# Patient Record
Sex: FEMALE | Race: BLACK OR AFRICAN AMERICAN | Employment: PART TIME | URBAN - METROPOLITAN AREA
[De-identification: names, ages, dates, MRNs, and addresses within clinical notes are randomized per-mention and may not be internally consistent; named-entity substitution may affect disease eponyms.]

---

## 2017-01-30 ENCOUNTER — HOSPITAL ENCOUNTER (OUTPATIENT)
Dept: LAB | Age: 42
Discharge: HOME | End: 2017-01-30
Payer: MEDICARE

## 2017-01-30 ENCOUNTER — OFFICE VISIT (OUTPATIENT)
Dept: INTERNAL MEDICINE CLINIC | Age: 42
End: 2017-01-30

## 2017-01-30 VITALS
WEIGHT: 218.4 LBS | RESPIRATION RATE: 18 BRPM | DIASTOLIC BLOOD PRESSURE: 103 MMHG | HEIGHT: 66 IN | HEART RATE: 81 BPM | SYSTOLIC BLOOD PRESSURE: 149 MMHG | OXYGEN SATURATION: 99 % | BODY MASS INDEX: 35.1 KG/M2 | TEMPERATURE: 98.5 F

## 2017-01-30 DIAGNOSIS — Z11.1 SCREENING-PULMONARY TB: ICD-10-CM

## 2017-01-30 PROCEDURE — 86480 TB TEST CELL IMMUN MEASURE: CPT

## 2017-01-30 PROCEDURE — 80053 COMPREHEN METABOLIC PANEL: CPT

## 2017-01-30 NOTE — PROGRESS NOTES
HISTORY OF PRESENT ILLNESS  Idamae Hodgkins is a 37 y.o. female presents for TB screening  HPI  TB screening required by employer. Patient works at a assisted living facility. No history of positive PPD. Denies other problems or concerns. Past Medical History   Diagnosis Date    Bipolar 1 disorder ([address]     Depression     Eczema     Elevated lipids     History of kidney stones        Current Outpatient Prescriptions on File Prior to Visit   Medication Sig Dispense Refill    ibuprofen (MOTRIN) 800 mg tablet Take 1 tablet by mouth every eight (8) hours as needed for Pain. 20 tablet 0    OLANZapine (ZYPREXA) 20 mg tablet Take 20 mg by mouth nightly.  triamcinolone (ARISTOCORT) 0.5 % topical cream USE A THIN LAYER AND APPLY TO AFFECTED AREA TWICE A DAY 15 g 0    gemfibrozil (LOPID) 600 mg tablet TAKE 1 TABLET BY MOUTH 2 TIMES A DAY. 60 Tab 3    topiramate (TOPAMAX) 50 mg tablet Take  by mouth nightly. No current facility-administered medications on file prior to visit. Review of Systems   Constitutional: Negative. Eyes: Negative. Cardiovascular: Negative. Negative for chest pain and leg swelling. Neurological: Negative for dizziness and headaches. Physical Exam   Constitutional: She is oriented to person, place, and time. She appears well-developed and well-nourished. No distress. HENT:   Right Ear: External ear normal.   Left Ear: External ear normal.   Nose: Nose normal.   Mouth/Throat: Oropharynx is clear and moist. No oropharyngeal exudate. Eyes: Conjunctivae are normal. Right eye exhibits no discharge. Left eye exhibits no discharge. Neck: No thyromegaly present. Cardiovascular: Normal rate, regular rhythm and normal heart sounds. Pulmonary/Chest: Effort normal and breath sounds normal.   Musculoskeletal: She exhibits no edema, tenderness or deformity. Lymphadenopathy:     She has no cervical adenopathy.    Neurological: She is alert and oriented to person, place, and time. Skin: Skin is warm and dry. She is not diaphoretic. Psychiatric: She has a normal mood and affect. Her behavior is normal. Judgment and thought content normal.       ASSESSMENT and PLAN    ICD-10-CM ICD-9-CM    1. Elevated blood pressure T33 534.8 METABOLIC PANEL, COMPREHENSIVE   2. Screening-pulmonary TB Z11.1 V74.1 QUANTIFERON TB GOLD     Follow-up Disposition:  Return in about 2 weeks (around 2/13/2017) for blood pressure. reviewed diet, exercise and weight control    1. Not new, reviewed prior blood pressure readings with patient.  She wishes to defer treatment unless no improvement on follow up

## 2017-01-30 NOTE — PROGRESS NOTES
RM#8  Chief Complaint   Patient presents with    PPD Reading     1. Have you been to the ER, urgent care clinic since your last visit? Hospitalized since your last visit? No    2. Have you seen or consulted any other health care providers outside of the [address] [place] [address] since your last visit? Include any pap smears or colon screening.  No  No living will ADV

## 2017-01-30 NOTE — PATIENT INSTRUCTIONS
Learning About Cortez Mcnulty  What is a living will? A living will is a legal form you use to write down the kind of care you want at the end of your life. It is used by the health professionals who will treat you if you aren't able to decide for yourself. If you put your wishes in writing, your loved ones and others will know what kind of care you want. They won't need to guess. This can ease your mind and be helpful to others. A living will is not the same as an estate or property will. An estate will explains what you want to happen with your money and property after you die. Is a living will a legal document? A living will is a legal document. Each state has its own laws about living skinner. If you move to another state, make sure that your living will is legal in the state where you now live. Or you might use a universal form that has been approved by many states. This kind of form can sometimes be completed and stored online. Your electronic copy will then be available wherever you have a connection to the Internet. In most cases, doctors will respect your wishes even if you have a form from a different state. · You don't need an  to complete a living will. But legal advice can be helpful if your state's laws are unclear, your health history is complicated, or your family can't agree on what should be in your living will. · You can change your living will at any time. Some people find that their wishes about end-of-life care change as their health changes. · In addition to making a living will, think about completing a medical power of  form. This form lets you name the person you want to make end-of-life treatment decisions for you (your \"health care agent\") if you're not able to. Many hospitals and nursing homes will give you the forms you need to complete a living will and a medical power of .   · Your living will is used only if you can't make or communicate decisions for yourself anymore. If you become able to make decisions again, you can accept or refuse any treatment, no matter what you wrote in your living will. · Your state may offer an online registry. This is a place where you can store your living will online so the doctors and nurses who need to treat you can find it right away. What should you think about when creating a living will? Talk about your end-of-life wishes with your family members and your doctor. Let them know what you want. That way the people making decisions for you won't be surprised by your choices. Think about these questions as you make your living will:  · Do you know enough about life support methods that might be used? If not, talk to your doctor so you know what might be done if you can't breathe on your own, your heart stops, or you're unable to swallow. · What things would you still want to be able to do after you receive life-support methods? Would you want to be able to walk? To speak? To eat on your own? To live without the help of machines? · If you have a choice, where do you want to be cared for? In your home? At a hospital or nursing home? · Do you want certain Scientology practices performed if you become very ill? · If you have a choice at the end of your life, where would you prefer to die? At home? In a hospital or nursing home? Somewhere else? · Would you prefer to be buried or cremated? · Do you want your organs to be donated after you die? What should you do with your living will? · Make sure that your family members and your health care agent have copies of your living will. · Give your doctor a copy of your living will to keep in your medical record. If you have more than one doctor, make sure that each one has a copy. · You may want to put a copy of your living will where it can be easily found. Where can you learn more? Go to http://shreya-ann.info/.   Enter F051 in the search box to learn more about \"Learning About Living Merle. \"  Current as of: February 24, 2016  Content Version: 11.1  © 2305-4291 [employer]. Care instructions adapted under license by [employer] (which disclaims liability or warranty for this information). If you have questions about a medical condition or this instruction, always ask your healthcare professional. [address] any warranty or liability for your use of this information. Low Sodium Diet (2,000 Milligram): Care Instructions  Your Care Instructions  Too much sodium causes your body to hold on to extra water. This can raise your blood pressure and force your heart and kidneys to work harder. In very serious cases, this could cause you to be put in the hospital. It might even be life-threatening. By limiting sodium, you will feel better and lower your risk of serious problems. The most common source of sodium is salt. People get most of the salt in their diet from canned, prepared, and packaged foods. Fast food and restaurant meals also are very high in sodium. Your doctor will probably limit your sodium to less than 2,000 milligrams (mg) a day. This limit counts all the sodium in prepared and packaged foods and any salt you add to your food. Follow-up care is a key part of your treatment and safety. Be sure to make and go to all appointments, and call your doctor if you are having problems. It's also a good idea to know your test results and keep a list of the medicines you take. How can you care for yourself at home? Read food labels  · Read labels on cans and food packages. The labels tell you how much sodium is in each serving. Make sure that you look at the serving size. If you eat more than the serving size, you have eaten more sodium. · Food labels also tell you the Percent Daily Value for sodium. Choose products with low Percent Daily Values for sodium.   · Be aware that sodium can come in forms other than salt, including monosodium glutamate (MSG), sodium citrate, and sodium bicarbonate (baking soda). MSG is often added to Asian food. When you eat out, you can sometimes ask for food without MSG or added salt. Buy low-sodium foods  · Buy foods that are labeled \"unsalted\" (no salt added), \"sodium-free\" (less than 5 mg of sodium per serving), or \"low-sodium\" (less than 140 mg of sodium per serving). Foods labeled \"reduced-sodium\" and \"light sodium\" may still have too much sodium. Be sure to read the label to see how much sodium you are getting. · Buy fresh vegetables, or frozen vegetables without added sauces. Buy low-sodium versions of canned vegetables, soups, and other canned goods. Prepare low-sodium meals  · Cut back on the amount of salt you use in cooking. This will help you adjust to the taste. Do not add salt after cooking. One teaspoon of salt has about 2,300 mg of sodium. · Take the salt shaker off the table. · Flavor your food with garlic, lemon juice, onion, vinegar, herbs, and spices. Do not use soy sauce, lite soy sauce, steak sauce, onion salt, garlic salt, celery salt, mustard, or ketchup on your food. · Use low-sodium salad dressings, sauces, and ketchup. Or make your own salad dressings and sauces without adding salt. · Use less salt (or none) when recipes call for it. You can often use half the salt a recipe calls for without losing flavor. Other foods such as rice, pasta, and grains do not need added salt. · Rinse canned vegetables, and cook them in fresh water. This removes somebut not allof the salt. · Avoid water that is naturally high in sodium or that has been treated with water softeners, which add sodium. Call your local water company to find out the sodium content of your water supply. If you buy bottled water, read the label and choose a sodium-free brand. Avoid high-sodium foods  · Avoid eating:  ¨ Smoked, cured, salted, and canned meat, fish, and poultry.   ¨ Ham, botello, hot dogs, and luncheon meats. ¨ Regular, hard, and processed cheese and regular peanut butter. ¨ Crackers with salted tops, and other salted snack foods such as pretzels, chips, and salted popcorn. ¨ Frozen prepared meals, unless labeled low-sodium. ¨ Canned and dried soups, broths, and bouillon, unless labeled sodium-free or low-sodium. ¨ Canned vegetables, unless labeled sodium-free or low-sodium. ¨ [place] fries, pizza, tacos, and other fast foods. ¨ Pickles, olives, ketchup, and other condiments, especially soy sauce, unless labeled sodium-free or low-sodium. Where can you learn more? Go to http://shreya-ann.info/. Enter F416 in the search box to learn more about \"Low Sodium Diet (2,000 Milligram): Care Instructions. \"  Current as of: July 26, 2016  Content Version: 11.1  © 7053-2555 [employer]. Care instructions adapted under license by [employer] (which disclaims liability or warranty for this information). If you have questions about a medical condition or this instruction, always ask your healthcare professional. [place] [address] any warranty or liability for your use of this information. Acute High Blood Pressure: Care Instructions  Your Care Instructions  Acute high blood pressure is very high blood pressure. It's a serious problem. Very high blood pressure can damage your brain, heart, eyes, and kidneys. You may have been given medicines to lower your blood pressure. You may have gotten them as pills or through a needle in one of your veins. This is called an IV. And maybe you were given other medicines too. These can be needed when high blood pressure causes other problems. To keep your blood pressure at a lower level, you may need to make healthy lifestyle changes. And you will probably need to take medicines. Be sure to follow up with your doctor about your blood pressure and what you can do about it.   Follow-up care is a key part of your treatment and safety. Be sure to make and go to all appointments, and call your doctor if you are having problems. It's also a good idea to know your test results and keep a list of the medicines you take. How can you care for yourself at home? · See your doctor as often as he or she recommends. This is to make sure your blood pressure is under control. You will probably go at least 2 times a year. But it may be more often at first.  · Take your blood pressure medicine exactly as prescribed. You may take one or more types. They include diuretics, beta-blockers, ACE inhibitors, calcium channel blockers, and angiotensin II receptor blockers. Call your doctor if you think you are having a problem with your medicine. · If you take blood pressure medicine, talk to your doctor before you take decongestants or anti-inflammatory medicine, such as ibuprofen. These can raise blood pressure. · Learn how to check your blood pressure at home. Check it often. · Ask your doctor if it's okay to drink alcohol. · Talk to your doctor about lifestyle changes that can help blood pressure. These include being active and not smoking. When should you call for help? Call 911 anytime you think you may need emergency care. This may mean having symptoms that suggest that your blood pressure is causing a serious heart or blood vessel problem. Your blood pressure may be over 180/110. For example, call 911 if:  · You have symptoms of a heart attack. These may include:  ¨ Chest pain or pressure, or a strange feeling in the chest.  ¨ Sweating. ¨ Shortness of breath. ¨ Nausea or vomiting. ¨ Pain, pressure, or a strange feeling in the back, neck, jaw, or upper belly or in one or both shoulders or arms. ¨ Lightheadedness or sudden weakness. ¨ A fast or irregular heartbeat. · You have symptoms of a stroke.  These may include:  ¨ Sudden numbness, tingling, weakness, or loss of movement in your face, arm, or leg, especially on only one side of your body. ¨ Sudden vision changes. ¨ Sudden trouble speaking. ¨ Sudden confusion or trouble understanding simple statements. ¨ Sudden problems with walking or balance. ¨ A sudden, severe headache that is different from past headaches. · You have severe back or belly pain. Do not wait until your blood pressure comes down on its own. Get help right away. Call your doctor now or seek immediate care if:  · Your blood pressure is much higher than normal (such as 180/110 or higher), but you don't have symptoms. · You think high blood pressure is causing symptoms, such as:  ¨ Severe headache. ¨ Blurry vision. Watch closely for changes in your health, and be sure to contact your doctor if:  · Your blood pressure measures 140/90 or higher at least 2 times. That means the top number is 140 or higher or the bottom number is 90 or higher, or both. · You think you may be having side effects from your blood pressure medicine. · Your blood pressure is usually normal, but it goes above normal at least 2 times. Where can you learn more? Go to http://shreya-ann.info/. Enter L806 in the search box to learn more about \"Acute High Blood Pressure: Care Instructions. \"  Current as of: August 8, 2016  Content Version: 11.1  © 4601-0826 [employer]. Care instructions adapted under license by [employer] (which disclaims liability or warranty for this information). If you have questions about a medical condition or this instruction, always ask your healthcare professional. [place] [address] any warranty or liability for your use of this information.

## 2017-01-30 NOTE — MR AVS SNAPSHOT
Visit Information Date & Time Provider Department Dept. Phone Encounter #  
 1/30/2017  2:15 PM Dirk Yang [place] [address] and Internal Medicine [phone] [id number] Follow-up Instructions Return in about 2 weeks (around 2/13/2017) for blood pressure. Upcoming Health Maintenance Date Due DTaP/Tdap/Td series (1 - Tdap) 4/27/1997 PAP AKA CERVICAL CYTOLOGY 1/7/2016 INFLUENZA AGE 9 TO ADULT 8/1/2016 Allergies as of 1/30/2017  Review Complete On: 1/30/2017 By: Dickson Nascimento NP No Known Allergies Current Immunizations  Reviewed on 9/30/2013 Name Date Influenza Vaccine 10/21/2015, 9/30/2013 Influenza Vaccine PF 9/26/2014 Not reviewed this visit You Were Diagnosed With   
  
 Codes Comments Elevated blood pressure    -  Primary ICD-10-CM: I10 
ICD-9-CM: 401.9 Screening-pulmonary TB     ICD-10-CM: Z11.1 ICD-9-CM: V74.1 Vitals BP Pulse Temp Resp Height(growth percentile) Weight(growth percentile) (!) 149/103 (BP 1 Location: Right arm, BP Patient Position: Sitting) 81 98.5 °F (36.9 °C) (Oral) 18 5' 5.98\" (1.676 m) 218 lb 6.4 oz (99.1 kg) LMP SpO2 BMI OB Status Smoking Status 01/10/2017 (Exact Date) 99% 35.27 kg/m2 Having regular periods Never Smoker BMI and BSA Data Body Mass Index Body Surface Area  
 35.27 kg/m 2 2.15 m 2 Preferred Pharmacy Pharmacy Name Phone [id number] [place], [address] [place] [address] [phone] Your Updated Medication List  
  
   
This list is accurate as of: 1/30/17  2:57 PM.  Always use your most recent med list.  
  
  
  
  
 gemfibrozil 600 mg tablet Commonly known as:  LOPID TAKE 1 TABLET BY MOUTH 2 TIMES A DAY. ibuprofen 800 mg tablet Commonly known as:  MOTRIN Take 1 tablet by mouth every eight (8) hours as needed for Pain. TOPAMAX 50 mg tablet Generic drug:  topiramate Take  by mouth nightly. triamcinolone 0.5 % topical cream  
Commonly known as:  ARISTOCORT  
USE A THIN LAYER AND APPLY TO AFFECTED AREA TWICE A DAY ZyPREXA 20 mg tablet Generic drug:  OLANZapine Take 20 mg by mouth nightly. We Performed the Following METABOLIC PANEL, COMPREHENSIVE [25513 CPT(R)] QUANTIFERON TB GOLD [HCP89558 Custom] Follow-up Instructions Return in about 2 weeks (around 2/13/2017) for blood pressure. Patient Instructions Marco [address] What is a living will? A living will is a legal form you use to write down the kind of care you want at the end of your life. It is used by the health professionals who will treat you if you aren't able to decide for yourself. If you put your wishes in writing, your loved ones and others will know what kind of care you want. They won't need to guess. This can ease your mind and be helpful to others. A living will is not the same as an estate or property will. An estate will explains what you want to happen with your money and property after you die. Is a living will a legal document? A living will is a legal document. Each state has its own laws about living skinner. If you move to another state, make sure that your living will is legal in the state where you now live. Or you might use a universal form that has been approved by many states. This kind of form can sometimes be completed and stored online. Your electronic copy will then be available wherever you have a connection to the Internet. In most cases, doctors will respect your wishes even if you have a form from a different state. · You don't need an  to complete a living will. But legal advice can be helpful if your state's laws are unclear, your health history is complicated, or your family can't agree on what should be in your living will. · You can change your living will at any time.  Some people find that their wishes about end-of-life care change as their health changes. · In addition to making a living will, think about completing a medical power of  form. This form lets you name the person you want to make end-of-life treatment decisions for you (your \"health care agent\") if you're not able to. Many hospitals and nursing homes will give you the forms you need to complete a living will and a medical power of . · Your living will is used only if you can't make or communicate decisions for yourself anymore. If you become able to make decisions again, you can accept or refuse any treatment, no matter what you wrote in your living will. · Your state may offer an online registry. This is a place where you can store your living will online so the doctors and nurses who need to treat you can find it right away. What should you think about when creating a living will? Talk about your end-of-life wishes with your family members and your doctor. Let them know what you want. That way the people making decisions for you won't be surprised by your choices. Think about these questions as you make your living will: · Do you know enough about life support methods that might be used? If not, talk to your doctor so you know what might be done if you can't breathe on your own, your heart stops, or you're unable to swallow. · What things would you still want to be able to do after you receive life-support methods? Would you want to be able to walk? To speak? To eat on your own? To live without the help of machines? · If you have a choice, where do you want to be cared for? In your home? At a hospital or nursing home? · Do you want certain Mandaeism practices performed if you become very ill? · If you have a choice at the end of your life, where would you prefer to die? At home? In a hospital or nursing home? Somewhere else? · Would you prefer to be buried or cremated? · Do you want your organs to be donated after you die? What should you do with your living will? · Make sure that your family members and your health care agent have copies of your living will. · Give your doctor a copy of your living will to keep in your medical record. If you have more than one doctor, make sure that each one has a copy. · You may want to put a copy of your living will where it can be easily found. Where can you learn more? Go to http://shreya-ann.info/. Enter F903 in the search box to learn more about \"Learning About Living Eris Ibarra. \" Current as of: February 24, 2016 Content Version: 11.1 © 2259-2425 [employer]. Care instructions adapted under license by [employer] (which disclaims liability or warranty for this information). If you have questions about a medical condition or this instruction, always ask your healthcare professional. [place] [address] any warranty or liability for your use of this information. Low Sodium Diet (2,000 Milligram): Care Instructions Your Care Instructions Too much sodium causes your body to hold on to extra water. This can raise your blood pressure and force your heart and kidneys to work harder. In very serious cases, this could cause you to be put in the hospital. It might even be life-threatening. By limiting sodium, you will feel better and lower your risk of serious problems. The most common source of sodium is salt. People get most of the salt in their diet from canned, prepared, and packaged foods. Fast food and restaurant meals also are very high in sodium. Your doctor will probably limit your sodium to less than 2,000 milligrams (mg) a day. This limit counts all the sodium in prepared and packaged foods and any salt you add to your food. Follow-up care is a key part of your treatment and safety.  Be sure to make and go to all appointments, and call your doctor if you are having problems. It's also a good idea to know your test results and keep a list of the medicines you take. How can you care for yourself at home? Read food labels · Read labels on cans and food packages. The labels tell you how much sodium is in each serving. Make sure that you look at the serving size. If you eat more than the serving size, you have eaten more sodium. · Food labels also tell you the Percent Daily Value for sodium. Choose products with low Percent Daily Values for sodium. · Be aware that sodium can come in forms other than salt, including monosodium glutamate (MSG), sodium citrate, and sodium bicarbonate (baking soda). MSG is often added to Asian food. When you eat out, you can sometimes ask for food without MSG or added salt. Buy low-sodium foods · Buy foods that are labeled \"unsalted\" (no salt added), \"sodium-free\" (less than 5 mg of sodium per serving), or \"low-sodium\" (less than 140 mg of sodium per serving). Foods labeled \"reduced-sodium\" and \"light sodium\" may still have too much sodium. Be sure to read the label to see how much sodium you are getting. · Buy fresh vegetables, or frozen vegetables without added sauces. Buy low-sodium versions of canned vegetables, soups, and other canned goods. Prepare low-sodium meals · Cut back on the amount of salt you use in cooking. This will help you adjust to the taste. Do not add salt after cooking. One teaspoon of salt has about 2,300 mg of sodium. · Take the salt shaker off the table. · Flavor your food with garlic, lemon juice, onion, vinegar, herbs, and spices. Do not use soy sauce, lite soy sauce, steak sauce, onion salt, garlic salt, celery salt, mustard, or ketchup on your food. · Use low-sodium salad dressings, sauces, and ketchup. Or make your own salad dressings and sauces without adding salt. · Use less salt (or none) when recipes call for it.  You can often use half the salt a recipe calls for without losing flavor. Other foods such as rice, pasta, and grains do not need added salt. · Rinse canned vegetables, and cook them in fresh water. This removes somebut not allof the salt. · Avoid water that is naturally high in sodium or that has been treated with water softeners, which add sodium. Call your local water company to find out the sodium content of your water supply. If you buy bottled water, read the label and choose a sodium-free brand. Avoid high-sodium foods · Avoid eating: ¨ Smoked, cured, salted, and canned meat, fish, and poultry. ¨ Ham, botello, hot dogs, and luncheon meats. ¨ Regular, hard, and processed cheese and regular peanut butter. ¨ Crackers with salted tops, and other salted snack foods such as pretzels, chips, and salted popcorn. ¨ Frozen prepared meals, unless labeled low-sodium. ¨ Canned and dried soups, broths, and bouillon, unless labeled sodium-free or low-sodium. ¨ Canned vegetables, unless labeled sodium-free or low-sodium. ¨ [place]ak fries, pizza, tacos, and other fast foods. ¨ Pickles, olives, ketchup, and other condiments, especially soy sauce, unless labeled sodium-free or low-sodium. Where can you learn more? Go to http://shreya-ann.info/. Enter T092 in the search box to learn more about \"Low Sodium Diet (2,000 Milligram): Care Instructions. \" Current as of: July 26, 2016 Content Version: 11.1 © 2951-1482 [employer]. Care instructions adapted under license by [employer] (which disclaims liability or warranty for this information). If you have questions about a medical condition or this instruction, always ask your healthcare professional. [place] [address] any warranty or liability for your use of this information. Acute High Blood Pressure: Care Instructions Your Care Instructions Acute high blood pressure is very high blood pressure.  It's a serious problem. Very high blood pressure can damage your brain, heart, eyes, and kidneys. You may have been given medicines to lower your blood pressure. You may have gotten them as pills or through a needle in one of your veins. This is called an IV. And maybe you were given other medicines too. These can be needed when high blood pressure causes other problems. To keep your blood pressure at a lower level, you may need to make healthy lifestyle changes. And you will probably need to take medicines. Be sure to follow up with your doctor about your blood pressure and what you can do about it. Follow-up care is a key part of your treatment and safety. Be sure to make and go to all appointments, and call your doctor if you are having problems. It's also a good idea to know your test results and keep a list of the medicines you take. How can you care for yourself at home? · See your doctor as often as he or she recommends. This is to make sure your blood pressure is under control. You will probably go at least 2 times a year. But it may be more often at first. 
· Take your blood pressure medicine exactly as prescribed. You may take one or more types. They include diuretics, beta-blockers, ACE inhibitors, calcium channel blockers, and angiotensin II receptor blockers. Call your doctor if you think you are having a problem with your medicine. · If you take blood pressure medicine, talk to your doctor before you take decongestants or anti-inflammatory medicine, such as ibuprofen. These can raise blood pressure. · Learn how to check your blood pressure at home. Check it often. · Ask your doctor if it's okay to drink alcohol. · Talk to your doctor about lifestyle changes that can help blood pressure. These include being active and not smoking. When should you call for help? Call 911 anytime you think you may need emergency care.  This may mean having symptoms that suggest that your blood pressure is causing a serious heart or blood vessel problem. Your blood pressure may be over 180/110. For example, call 911 if: 
· You have symptoms of a heart attack. These may include: ¨ Chest pain or pressure, or a strange feeling in the chest. 
¨ Sweating. ¨ Shortness of breath. ¨ Nausea or vomiting. ¨ Pain, pressure, or a strange feeling in the back, neck, jaw, or upper belly or in one or both shoulders or arms. ¨ Lightheadedness or sudden weakness. ¨ A fast or irregular heartbeat. · You have symptoms of a stroke. These may include: 
¨ Sudden numbness, tingling, weakness, or loss of movement in your face, arm, or leg, especially on only one side of your body. ¨ Sudden vision changes. ¨ Sudden trouble speaking. ¨ Sudden confusion or trouble understanding simple statements. ¨ Sudden problems with walking or balance. ¨ A sudden, severe headache that is different from past headaches. · You have severe back or belly pain. Do not wait until your blood pressure comes down on its own. Get help right away. Call your doctor now or seek immediate care if: 
· Your blood pressure is much higher than normal (such as 180/110 or higher), but you don't have symptoms. · You think high blood pressure is causing symptoms, such as: ¨ Severe headache. ¨ Blurry vision. Watch closely for changes in your health, and be sure to contact your doctor if: 
· Your blood pressure measures 140/90 or higher at least 2 times. That means the top number is 140 or higher or the bottom number is 90 or higher, or both. · You think you may be having side effects from your blood pressure medicine. · Your blood pressure is usually normal, but it goes above normal at least 2 times. Where can you learn more? Go to http://shreya-ann.info/. Enter F130 in the search box to learn more about \"Acute High Blood Pressure: Care Instructions. \" Current as of: August 8, 2016 Content Version: 11.1 © 2173-1198 [employer]. Care instructions adapted under license by [employer] (which disclaims liability or warranty for this information). If you have questions about a medical condition or this instruction, always ask your healthcare professional. [address] any warranty or liability for your use of this information. Introducing [facility]! John Novak introduces My Point...Exactly patient portal. Now you can access parts of your medical record, email your doctor's office, and request medication refills online. 1. In your internet browser, go to [email or web address] 2. Click on the First Time User? Click Here link in the Sign In box. You will see the New Member Sign Up page. 3. Enter your My Point...Exactly Access Code exactly as it appears below. You will not need to use this code after youve completed the sign-up process. If you do not sign up before the expiration date, you must request a new code. · My Point...Exactly Access Code: [email or web address] Expires: 4/30/2017  1:46 PM 
 
4. Enter the last four digits of your Social Security Number (xxxx) and Date of Birth (mm/dd/yyyy) as indicated and click Submit. You will be taken to the next sign-up page. 5. Create a My Point...Exactly ID. This will be your My Point...Exactly login ID and cannot be changed, so think of one that is secure and easy to remember. 6. Create a My Point...Exactly password. You can change your password at any time. 7. Enter your Password Reset Question and Answer. This can be used at a later time if you forget your password. 8. Enter your e-mail address. You will receive e-mail notification when new information is available in [address] 9. Click Sign Up. You can now view and download portions of your medical record. 10. Click the Download Summary menu link to download a portable copy of your medical information.  
 
If you have questions, please visit the Frequently Asked Questions section of the [facility]essel. Remember, [employer] is NOT to be used for urgent needs. For medical emergencies, dial 911. Now available from your iPhone and Android! Please provide this summary of care documentation to your next provider. Your primary care clinician is listed as Kelley Cardenas. If you have any questions after today's visit, please call [phone].

## 2017-02-03 LAB
ALBUMIN SERPL-MCNC: 4.1 G/DL (ref 3.5–5.5)
ALBUMIN/GLOB SERPL: 1.5 {RATIO} (ref 1.1–2.5)
ALP SERPL-CCNC: 101 IU/L (ref 39–117)
ALT SERPL-CCNC: 33 IU/L (ref 0–32)
ANNOTATION COMMENT IMP: NORMAL
AST SERPL-CCNC: 30 IU/L (ref 0–40)
BILIRUB SERPL-MCNC: 0.6 MG/DL (ref 0–1.2)
BUN SERPL-MCNC: 8 MG/DL (ref 6–24)
BUN/CREAT SERPL: 10 (ref 9–23)
CALCIUM SERPL-MCNC: 9.8 MG/DL (ref 8.7–10.2)
CHLORIDE SERPL-SCNC: 103 MMOL/L (ref 96–106)
CO2 SERPL-SCNC: 24 MMOL/L (ref 18–29)
CREAT SERPL-MCNC: 0.8 MG/DL (ref 0.57–1)
GAMMA INTERFERON BACKGROUND BLD IA-ACNC: 0.04 IU/ML
GLOBULIN SER CALC-MCNC: 2.8 G/DL (ref 1.5–4.5)
GLUCOSE SERPL-MCNC: 85 MG/DL (ref 65–99)
M TB IFN-G BLD-IMP: NEGATIVE
M TB IFN-G CD4+ BCKGRND COR BLD-ACNC: 0 IU/ML
M TB IFN-G CD4+ T-CELLS BLD-ACNC: 0.04 IU/ML
MITOGEN IGNF BLD-ACNC: >10 IU/ML
POTASSIUM SERPL-SCNC: 4.2 MMOL/L (ref 3.5–5.2)
PROT SERPL-MCNC: 6.9 G/DL (ref 6–8.5)
QUANTIFERON INCUBATION: NORMAL
SERVICE CMNT-IMP: NORMAL
SODIUM SERPL-SCNC: 143 MMOL/L (ref 134–144)

## 2017-08-07 ENCOUNTER — OFFICE VISIT (OUTPATIENT)
Dept: INTERNAL MEDICINE CLINIC | Age: 42
End: 2017-08-07

## 2017-08-07 VITALS
HEIGHT: 66 IN | DIASTOLIC BLOOD PRESSURE: 90 MMHG | HEART RATE: 100 BPM | RESPIRATION RATE: 18 BRPM | OXYGEN SATURATION: 96 % | SYSTOLIC BLOOD PRESSURE: 130 MMHG | TEMPERATURE: 98.4 F | WEIGHT: 215.6 LBS | BODY MASS INDEX: 34.65 KG/M2

## 2017-08-07 DIAGNOSIS — R03.0 ELEVATED BLOOD PRESSURE READING: Primary | ICD-10-CM

## 2017-08-07 DIAGNOSIS — L30.9 DERMATITIS: ICD-10-CM

## 2017-08-07 RX ORDER — TRIAMCINOLONE ACETONIDE 5 MG/G
CREAM TOPICAL
Qty: 30 G | Refills: 0 | Status: SHIPPED | OUTPATIENT
Start: 2017-08-07 | End: 2017-11-04 | Stop reason: SDUPTHER

## 2017-08-07 NOTE — PROGRESS NOTES
RM#9  Chief Complaint   Patient presents with    Follow-up     B/P ck f/u     1. Have you been to the ER, urgent care clinic since your last visit? Hospitalized since your last visit? No    2. Have you seen or consulted any other health care providers outside of [facility] [place] since your last visit? Include any pap smears or colon screening.  No  Health Maintenance Due   Topic Date Due    DTaP/Tdap/Td series (1 - Tdap) 04/27/1997    PAP AKA CERVICAL CYTOLOGY  01/07/2016    INFLUENZA AGE 9 TO ADULT  08/01/2017

## 2017-08-07 NOTE — MR AVS SNAPSHOT
Visit Information Date & Time Provider Department Dept. Phone Encounter #  
 8/7/2017  1:30 PM Rowena Johnson [address] [facility] and Internal Medicine [phone] [id number] Follow-up Instructions Return in about 3 months (around 11/7/2017) for blood pressure, fasting labs. Upcoming Health Maintenance Date Due DTaP/Tdap/Td series (1 - Tdap) 4/27/1997 PAP AKA CERVICAL CYTOLOGY 1/7/2016 INFLUENZA AGE 9 TO ADULT 8/1/2017 Allergies as of 8/7/2017  Review Complete On: 8/7/2017 By: Barry Lilly NP No Known Allergies Current Immunizations  Reviewed on 9/30/2013 Name Date Influenza Vaccine 10/21/2015, 9/30/2013 Influenza Vaccine PF 9/26/2014 Not reviewed this visit You Were Diagnosed With   
  
 Codes Comments Elevated blood pressure reading    -  Primary ICD-10-CM: R03.0 ICD-9-CM: 796.2 Dermatitis     ICD-10-CM: L30.9 ICD-9-CM: 692.9 Vitals BP Pulse Temp Resp Height(growth percentile) Weight(growth percentile) 130/90 100 98.4 °F (36.9 °C) (Oral) 18 5' 5.98\" (1.676 m) 215 lb 9.6 oz (97.8 kg) LMP SpO2 BMI OB Status Smoking Status 08/01/2017 (Exact Date) 96% 34.82 kg/m2 Having regular periods Never Smoker Vitals History BMI and BSA Data Body Mass Index Body Surface Area 34.82 kg/m 2 2.13 m 2 Preferred Pharmacy Pharmacy Name Phone [id number] [id number] [place] [address] [phone] Your Updated Medication List  
  
   
This list is accurate as of: 8/7/17  1:44 PM.  Always use your most recent med list.  
  
  
  
  
 ibuprofen 800 mg tablet Commonly known as:  MOTRIN Take 1 tablet by mouth every eight (8) hours as needed for Pain.  
  
 triamcinolone 0.5 % topical cream  
Commonly known as:  ARISTOCORT  
USE A THIN LAYER AND APPLY TO AFFECTED AREA TWICE A DAY ZyPREXA 20 mg tablet Generic drug:  OLANZapine Take 20 mg by mouth nightly. Prescriptions Sent to Pharmacy Refills  
 triamcinolone (ARISTOCORT) 0.5 % topical cream 0 Sig: USE A THIN LAYER AND APPLY TO AFFECTED AREA TWICE A DAY Class: Normal  
 Pharmacy: [address] [place] [address] , [address] [place] [address] #: [phone] Follow-up Instructions Return in about 3 months (around 11/7/2017) for blood pressure, fasting labs. Introducing [facility] & [facility]! Socorro Oliva introduces Query Hunter patient portal. Now you can access parts of your medical record, email your doctor's office, and request medication refills online. 1. In your internet browser, go to [email or web address] 2. Click on the First Time User? Click Here link in the Sign In box. You will see the New Member Sign Up page. 3. Enter your Query Hunter Access Code exactly as it appears below. You will not need to use this code after youve completed the sign-up process. If you do not sign up before the expiration date, you must request a new code. · Query Hunter Access Code: [email or web address] Expires: 11/5/2017  1:44 PM 
 
4. Enter the last four digits of your Social Security Number (xxxx) and Date of Birth (mm/dd/yyyy) as indicated and click Submit. You will be taken to the next sign-up page. 5. Create a Query Hunter ID. This will be your Query Hunter login ID and cannot be changed, so think of one that is secure and easy to remember. 6. Create a Query Hunter password. You can change your password at any time. 7. Enter your Password Reset Question and Answer. This can be used at a later time if you forget your password. 8. Enter your e-mail address. You will receive e-mail notification when new information is available in [address] 9. Click Sign Up. You can now view and download portions of your medical record. 10. Click the Download Summary menu link to download a portable copy of your medical information. If you have questions, please visit the Frequently Asked Questions section of the [employer] website. Remember, [employer] is NOT to be used for urgent needs. For medical emergencies, dial 911. Now available from your iPhone and Android! Please provide this summary of care documentation to your next provider. Your primary care clinician is listed as Beata Parry. If you have any questions after today's visit, please call [phone].

## 2017-08-07 NOTE — PROGRESS NOTES
HISTORY OF PRESENT ILLNESS  Eunice Purcell is a 40 y.o. female presents for blood pressure follow up  HPI     No interim change in medical history or medication    Blood pressure readings 130's/80's. Needs medication refill     Discontinued cholesterol medication. She has changed to a healthier diet and increased exercise. Past Medical History:   Diagnosis Date    Bipolar 1 disorder ([facility] [address]     Depression     Eczema     Elevated lipids     History of kidney stones      Current Outpatient Prescriptions on File Prior to Visit   Medication Sig Dispense Refill    ibuprofen (MOTRIN) 800 mg tablet Take 1 tablet by mouth every eight (8) hours as needed for Pain. 20 tablet 0    OLANZapine (ZYPREXA) 20 mg tablet Take 20 mg by mouth nightly. No current facility-administered medications on file prior to visit. Review of Systems   Constitutional: Negative for weight loss. Respiratory: Negative. Cardiovascular: Negative. Gastrointestinal: Negative. Neurological: Negative for dizziness and headaches. Physical Exam   Constitutional: She appears well-developed and well-nourished. No distress. Neck: Normal range of motion. Neck supple. Cardiovascular: Normal rate and regular rhythm. Pulmonary/Chest: Effort normal and breath sounds normal.   Lymphadenopathy:     She has no cervical adenopathy. Skin: She is not diaphoretic. ASSESSMENT and PLAN    ICD-10-CM ICD-9-CM    1. Elevated blood pressure reading R03.0 796.2    2. Dermatitis L30.9 692.9 triamcinolone (ARISTOCORT) 0.5 % topical cream     Follow-up Disposition:  Return in about 3 months (around 11/7/2017) for blood pressure, fasting labs.   current treatment plan is effective, no change in therapy  lab results and schedule of future lab studies reviewed with patient  reviewed diet, exercise and weight control  reviewed medications and side effects in detail  use of aspirin to prevent MI and TIA's discussed

## 2017-11-04 DIAGNOSIS — L30.9 DERMATITIS: ICD-10-CM

## 2017-11-06 RX ORDER — TRIAMCINOLONE ACETONIDE 5 MG/G
CREAM TOPICAL
Qty: 30 G | Refills: 0 | Status: SHIPPED | OUTPATIENT
Start: 2017-11-06 | End: 2019-01-27

## 2018-01-16 ENCOUNTER — TELEPHONE (OUTPATIENT)
Dept: INTERNAL MEDICINE CLINIC | Age: 43
End: 2018-01-16

## 2018-01-16 NOTE — TELEPHONE ENCOUNTER
Pt's niece Tom Longo (Not on HIPPA) called to inform NP Laurita Speaks that the patient stopped taking her medication for biploar and she is already \"showing signs of slipping\". She stated that the family is coming together this weekend to try and have an intervention but they wanted to inform NP Laurita Speaks of what is going on.

## 2018-01-17 NOTE — TELEPHONE ENCOUNTER
Please ask caller to contact patient's mental health provider to inform them of this.  She no longer sees providers at [facility]

## 2018-01-17 NOTE — TELEPHONE ENCOUNTER
Do you know who her mental health provider is? Unable to contact pt, and unable to contact caller. No one on HIPPA.   Please advise

## 2018-01-17 NOTE — TELEPHONE ENCOUNTER
I don't know who her new providers are.  If you call pharmacy they will be able to tell who prescribed psychiatric medications

## 2018-12-26 ENCOUNTER — HOSPITAL ENCOUNTER (OUTPATIENT)
Dept: LAB | Age: 43
Discharge: HOME | End: 2018-12-26
Payer: MEDICARE

## 2018-12-26 ENCOUNTER — OFFICE VISIT (OUTPATIENT)
Dept: INTERNAL MEDICINE CLINIC | Age: 43
End: 2018-12-26

## 2018-12-26 VITALS
OXYGEN SATURATION: 100 % | HEIGHT: 66 IN | BODY MASS INDEX: 30.82 KG/M2 | SYSTOLIC BLOOD PRESSURE: 144 MMHG | TEMPERATURE: 98.7 F | WEIGHT: 191.8 LBS | RESPIRATION RATE: 17 BRPM | DIASTOLIC BLOOD PRESSURE: 94 MMHG | HEART RATE: 77 BPM

## 2018-12-26 DIAGNOSIS — Z12.31 ENCOUNTER FOR SCREENING MAMMOGRAM FOR MALIGNANT NEOPLASM OF BREAST: ICD-10-CM

## 2018-12-26 DIAGNOSIS — I10 ESSENTIAL HYPERTENSION: ICD-10-CM

## 2018-12-26 DIAGNOSIS — F99 PSYCHIATRIC DISORDER: ICD-10-CM

## 2018-12-26 DIAGNOSIS — Z71.89 ADVANCE DIRECTIVE DISCUSSED WITH PATIENT: ICD-10-CM

## 2018-12-26 DIAGNOSIS — Z00.00 ANNUAL PHYSICAL EXAM: Primary | ICD-10-CM

## 2018-12-26 PROCEDURE — 80061 LIPID PANEL: CPT

## 2018-12-26 PROCEDURE — 85025 COMPLETE CBC W/AUTO DIFF WBC: CPT

## 2018-12-26 PROCEDURE — 36415 COLL VENOUS BLD VENIPUNCTURE: CPT

## 2018-12-26 PROCEDURE — 80053 COMPREHEN METABOLIC PANEL: CPT

## 2018-12-26 PROCEDURE — 83036 HEMOGLOBIN GLYCOSYLATED A1C: CPT

## 2018-12-26 RX ORDER — FLUPHENAZINE HYDROCHLORIDE 5 MG/1
TABLET ORAL
Refills: 0 | COMMUNITY
Start: 2018-12-19 | End: 2020-02-03 | Stop reason: DRUGHIGH

## 2018-12-26 RX ORDER — OLMESARTAN MEDOXOMIL 20 MG/1
20 TABLET ORAL DAILY
Qty: 30 TAB | Refills: 3 | Status: SHIPPED | OUTPATIENT
Start: 2018-12-26 | End: 2019-01-07 | Stop reason: ALTCHOICE

## 2018-12-26 RX ORDER — OLMESARTAN MEDOXOMIL 20 MG/1
20 TABLET ORAL DAILY
Qty: 30 TAB | Refills: 3 | Status: SHIPPED | OUTPATIENT
Start: 2018-12-26 | End: 2018-12-26 | Stop reason: SDUPTHER

## 2018-12-26 RX ORDER — CARBAMAZEPINE 200 MG/1
TABLET, EXTENDED RELEASE ORAL
Refills: 0 | COMMUNITY
Start: 2018-12-20 | End: 2021-04-21 | Stop reason: SDUPTHER

## 2018-12-26 NOTE — PROGRESS NOTES
HISTORY OF PRESENT ILLNESS  Afua Gonzalez is a 44 y.o. female with history of hyperlipidemia obesity, mental illness  HPI  Mental  health provider, Henrry Morin NP. [facility]      Psychiatric medications effective, no ADRs    Lost weight, walking every day, eating healthier     Joined gyn to tone up    Pap smear current, provider Dr Martin Chan, now reitred    mammo due    Eye exam current     [facility] November 28    No tobacco or alchohol    Past Medical History:   Diagnosis Date    Bipolar 1 disorder ([address]     Depression     Eczema     Elevated lipids     History of kidney stones      Current Outpatient Medications on File Prior to Visit   Medication Sig Dispense Refill    carBAMazepine XR (TEGRETOL XR) 200 mg SR tablet   0    fluPHENAZine (PROLIXIN) 5 mg tablet take 1 tablet by mouth at bedtime  0    ibuprofen (MOTRIN) 800 mg tablet Take 1 tablet by mouth every eight (8) hours as needed for Pain. 20 tablet 0    triamcinolone (ARISTOCORT) 0.5 % topical cream USE A THIN LAYER AND APPLY TO AFFECTED AREA TWICE A DAY 30 g 0    OLANZapine (ZYPREXA) 20 mg tablet Take 20 mg by mouth nightly. No current facility-administered medications on file prior to visit. Review of Systems   Constitutional: Negative. HENT: Negative. Eyes: Negative. Respiratory: Negative. Cardiovascular: Negative. Gastrointestinal: Negative. Genitourinary: Negative. Skin: Negative. Neurological: Negative. Endo/Heme/Allergies: Negative. Psychiatric/Behavioral: Negative for depression. Visit Vitals  BP (!) 144/94 (BP 1 Location: Left arm, BP Patient Position: Sitting)   Pulse 77   Temp 98.7 °F (37.1 °C) (Oral)   Resp 17   Ht 5' 5.98\" (1.676 m)   Wt 191 lb 12.8 oz (87 kg)   LMP 11/28/2018   SpO2 100%   BMI 30.98 kg/m²     Physical Exam   Constitutional: She is oriented to person, place, and time. She appears well-developed and well-nourished. No distress.    HENT:   Head: Normocephalic and atraumatic. Right Ear: External ear normal.   Left Ear: External ear normal.   Nose: Nose normal.   Mouth/Throat: Oropharynx is clear and moist.   Eyes: Conjunctivae are normal. Right eye exhibits no discharge. Left eye exhibits no discharge. Neck: Normal range of motion. Neck supple. Cardiovascular: Normal rate, regular rhythm and normal heart sounds. Pulmonary/Chest: Effort normal and breath sounds normal. No respiratory distress. Abdominal: Soft. Bowel sounds are normal. She exhibits no distension. There is no tenderness. Musculoskeletal: She exhibits no edema, tenderness or deformity. Lymphadenopathy:     She has no cervical adenopathy. Neurological: She is alert and oriented to person, place, and time. Skin: Skin is warm and dry. She is not diaphoretic. Psychiatric: She has a normal mood and affect. Her behavior is normal. Judgment and thought content normal.       ASSESSMENT and PLAN    ICD-10-CM ICD-9-CM    1. Annual physical exam Z00.00 V70.0 [facility] MAMMO BI SCREENING INCL CAD      LIPID PANEL      METABOLIC PANEL, COMPREHENSIVE      CBC WITH AUTOMATED DIFF      HEMOGLOBIN A1C WITH EAG   2. Advance directive discussed with patient Z71.89 V65.49    3. Essential hypertension I10 401.9 olmesartan (BENICAR) 20 mg tablet      LIPID PANEL      METABOLIC PANEL, COMPREHENSIVE      DISCONTINUED: olmesartan (BENICAR) 20 mg tablet   4. Psychiatric disorder F99 300.9    5. Encounter for screening mammogram for malignant neoplasm of breast Z12.31 V76.12 [facility] MAMMO BI SCREENING INCL CAD     Follow-up Disposition:  Return in about 4 weeks (around 1/23/2019) for htn. current treatment plan is effective, no change in therapy  lab results and schedule of future lab studies reviewed with patient  reviewed diet, exercise and weight control  cardiovascular risk and specific lipid/LDL goals reviewed    Reviewed prior blood pressure readings, benefits of  blood pressure lowering.  Patient agrees to start medical management    Strongly encouraged to continue healthy lifestyle, follow up with mental health providers    Patient voices understanding and acceptance of this advice and will call back if any further questions or concerns. An After Visit Summary was printed and given to the patient.

## 2018-12-26 NOTE — PROGRESS NOTES
Exam room 7    Erna Aragon is a 44 y.o. female   Fasting      Chief Complaint   Patient presents with    Complete Physical     1. Have you been to the ER, urgent care clinic since your last visit? Hospitalized since your last visit? No    2. Have you seen or consulted any other health care providers outside of the [facility] since your last visit? Include any pap smears or colon screening.  No         Health Maintenance Due   Topic Date Due    DTaP/Tdap/Td series (1 - Tdap) 04/27/1997    PAP AKA CERVICAL CYTOLOGY  01/07/2016    MEDICARE YEARLY EXAM  03/14/2018    Influenza Age 9 to Adult  08/01/2018       Refused influenza vaccine

## 2018-12-27 LAB
ALBUMIN SERPL-MCNC: 4.2 G/DL (ref 3.5–5.5)
ALBUMIN/GLOB SERPL: 1.6 {RATIO} (ref 1.2–2.2)
ALP SERPL-CCNC: 87 IU/L (ref 39–117)
ALT SERPL-CCNC: 18 IU/L (ref 0–32)
AST SERPL-CCNC: 33 IU/L (ref 0–40)
BASOPHILS # BLD AUTO: 0 X10E3/UL (ref 0–0.2)
BASOPHILS NFR BLD AUTO: 0 %
BILIRUB SERPL-MCNC: 0.3 MG/DL (ref 0–1.2)
BUN SERPL-MCNC: 10 MG/DL (ref 6–24)
BUN/CREAT SERPL: 14 (ref 9–23)
CALCIUM SERPL-MCNC: 9.2 MG/DL (ref 8.7–10.2)
CHLORIDE SERPL-SCNC: 105 MMOL/L (ref 96–106)
CHOLEST SERPL-MCNC: 167 MG/DL (ref 100–199)
CO2 SERPL-SCNC: 22 MMOL/L (ref 20–29)
CREAT SERPL-MCNC: 0.71 MG/DL (ref 0.57–1)
EOSINOPHIL # BLD AUTO: 0.2 X10E3/UL (ref 0–0.4)
EOSINOPHIL NFR BLD AUTO: 2 %
ERYTHROCYTE [DISTWIDTH] IN BLOOD BY AUTOMATED COUNT: 13.8 % (ref 12.3–15.4)
EST. AVERAGE GLUCOSE BLD GHB EST-MCNC: 100 MG/DL
GLOBULIN SER CALC-MCNC: 2.7 G/DL (ref 1.5–4.5)
GLUCOSE SERPL-MCNC: 81 MG/DL (ref 65–99)
HBA1C MFR BLD: 5.1 % (ref 4.8–5.6)
HCT VFR BLD AUTO: 40.9 % (ref 34–46.6)
HDLC SERPL-MCNC: 43 MG/DL
HGB BLD-MCNC: 14.2 G/DL (ref 11.1–15.9)
IMM GRANULOCYTES # BLD: 0 X10E3/UL (ref 0–0.1)
IMM GRANULOCYTES NFR BLD: 0 %
LDLC SERPL CALC-MCNC: 100 MG/DL (ref 0–99)
LYMPHOCYTES # BLD AUTO: 2.5 X10E3/UL (ref 0.7–3.1)
LYMPHOCYTES NFR BLD AUTO: 29 %
MCH RBC QN AUTO: 31.1 PG (ref 26.6–33)
MCHC RBC AUTO-ENTMCNC: 34.7 G/DL (ref 31.5–35.7)
MCV RBC AUTO: 90 FL (ref 79–97)
MONOCYTES # BLD AUTO: 0.5 X10E3/UL (ref 0.1–0.9)
MONOCYTES NFR BLD AUTO: 6 %
NEUTROPHILS # BLD AUTO: 5.5 X10E3/UL (ref 1.4–7)
NEUTROPHILS NFR BLD AUTO: 63 %
PLATELET # BLD AUTO: 241 X10E3/UL (ref 150–379)
POTASSIUM SERPL-SCNC: 4.1 MMOL/L (ref 3.5–5.2)
PROT SERPL-MCNC: 6.9 G/DL (ref 6–8.5)
RBC # BLD AUTO: 4.56 X10E6/UL (ref 3.77–5.28)
SODIUM SERPL-SCNC: 141 MMOL/L (ref 134–144)
TRIGL SERPL-MCNC: 119 MG/DL (ref 0–149)
VLDLC SERPL CALC-MCNC: 24 MG/DL (ref 5–40)
WBC # BLD AUTO: 8.7 X10E3/UL (ref 3.4–10.8)

## 2018-12-31 NOTE — ACP (ADVANCE CARE PLANNING)
Advance Care Planning (ACP) Provider Conversation Snapshot    Date of ACP Conversation: 12/31/18  Persons included in Conversation:  patient  Length of ACP Conversation in minutes:  <16 minutes (Non-Billable)    Authorized Decision Maker (if patient is incapable of making informed decisions):    This person is:   n/a          For Patients with Decision Making Capacity:   Values/Goals: Exploration of values, goals, and preferences if recovery is not expected, even with continued medical treatment in the event of:  Imminent death    Conversation Outcomes / Follow-Up Plan:   Recommended completion of Advance Directive form after review of ACP materials and conversation with prospective healthcare agent

## 2019-01-07 ENCOUNTER — TELEPHONE (OUTPATIENT)
Dept: INTERNAL MEDICINE CLINIC | Age: 44
End: 2019-01-07

## 2019-01-07 RX ORDER — LOSARTAN POTASSIUM 100 MG/1
100 TABLET ORAL DAILY
Qty: 30 TAB | Refills: 1 | Status: SHIPPED | OUTPATIENT
Start: 2019-01-07 | End: 2019-02-05 | Stop reason: SDUPTHER

## 2019-01-07 NOTE — TELEPHONE ENCOUNTER
----- Message from Annette Swan sent at 1/5/2019  9:45 AM EST -----  Regarding: NP [place]/ refill  The pt called because her pharmacy doesn't have the blood pressure medication that prescribed at last visit.  They are requesting something else be sent over for her; the Rx should go to the [phone]      Best contact number is [phone]

## 2019-01-07 NOTE — TELEPHONE ENCOUNTER
Spoke to pt she state's that her [facility] doesn't carry the Benicar pt would like if NP Cris Yu could prescribe a different BP medication.

## 2019-01-23 ENCOUNTER — OFFICE VISIT (OUTPATIENT)
Dept: INTERNAL MEDICINE CLINIC | Age: 44
End: 2019-01-23

## 2019-01-23 VITALS
WEIGHT: 196.6 LBS | OXYGEN SATURATION: 100 % | HEIGHT: 66 IN | BODY MASS INDEX: 31.6 KG/M2 | HEART RATE: 90 BPM | RESPIRATION RATE: 18 BRPM | TEMPERATURE: 98.2 F | SYSTOLIC BLOOD PRESSURE: 138 MMHG | DIASTOLIC BLOOD PRESSURE: 75 MMHG

## 2019-01-23 DIAGNOSIS — I10 ESSENTIAL HYPERTENSION: Primary | ICD-10-CM

## 2019-01-23 DIAGNOSIS — E78.00 PURE HYPERCHOLESTEROLEMIA: ICD-10-CM

## 2019-01-23 DIAGNOSIS — Z12.31 ENCOUNTER FOR SCREENING MAMMOGRAM FOR MALIGNANT NEOPLASM OF BREAST: ICD-10-CM

## 2019-01-23 NOTE — PATIENT INSTRUCTIONS
High Cholesterol: Care Instructions Your Care Instructions Cholesterol is a type of fat in your blood. It is needed for many body functions, such as making new cells. Cholesterol is made by your body. It also comes from food you eat. High cholesterol means that you have too much of the fat in your blood. This raises your risk of a heart attack and stroke. LDL and HDL are part of your total cholesterol. LDL is the \"bad\" cholesterol. High LDL can raise your risk for heart disease, heart attack, and stroke. HDL is the \"good\" cholesterol. It helps clear bad cholesterol from the body. High HDL is linked with a lower risk of heart disease, heart attack, and stroke. Your cholesterol levels help your doctor find out your risk for having a heart attack or stroke. You and your doctor can talk about whether you need to lower your risk and what treatment is best for you. A heart-healthy lifestyle along with medicines can help lower your cholesterol and your risk. The way you choose to lower your risk will depend on how high your risk is for heart attack and stroke. It will also depend on how you feel about taking medicines. Follow-up care is a key part of your treatment and safety. Be sure to make and go to all appointments, and call your doctor if you are having problems. It's also a good idea to know your test results and keep a list of the medicines you take. How can you care for yourself at home? · Eat a variety of foods every day. Good choices include fruits, vegetables, whole grains (like oatmeal), dried beans and peas, nuts and seeds, soy products (like tofu), and fat-free or low-fat dairy products. · Replace butter, margarine, and hydrogenated or partially hydrogenated oils with olive and canola oils. (Canola oil margarine without trans fat is fine.) · Replace red meat with fish, poultry, and soy protein (like tofu). · Limit processed and packaged foods like chips, crackers, and cookies. · Bake, broil, or steam foods. Don't jose them. · Be physically active. Get at least 30 minutes of exercise on most days of the week. Walking is a good choice. You also may want to do other activities, such as running, swimming, cycling, or playing tennis or team sports. · Stay at a healthy weight or lose weight by making the changes in eating and physical activity listed above. Losing just a small amount of weight, even 5 to 10 pounds, can reduce your risk for having a heart attack or stroke. · Do not smoke. When should you call for help? Watch closely for changes in your health, and be sure to contact your doctor if: 
  · You need help making lifestyle changes.  
  · You have questions about your medicine. Where can you learn more? Go to http://shreya-ann.info/. Enter V569 in the search box to learn more about \"High Cholesterol: Care Instructions. \" Current as of: July 22, 2018 Content Version: 11.9 © 9338-6706 [employer]. Care instructions adapted under license by [employer] (which disclaims liability or warranty for this information). If you have questions about a medical condition or this instruction, always ask your healthcare professional. [address] any warranty or liability for your use of this information. Learning About High Cholesterol What is high cholesterol? Cholesterol is a type of fat in your blood. It is needed for many body functions, such as making new cells. Cholesterol is made by your body. It also comes from food you eat. If you have too much cholesterol, it starts to build up in your arteries. This is called hardening of the arteries, or atherosclerosis. High cholesterol raises your risk of a heart attack and stroke. There are different types of cholesterol. LDL is the \"bad\" cholesterol. High LDL can raise your risk for heart disease, heart attack, and stroke. HDL is the \"good\" cholesterol. High HDL is linked with a lower risk for heart disease, heart attack, and stroke. Your cholesterol levels help your doctor find out your risk for having a heart attack or stroke. How can you prevent high cholesterol? A heart-healthy lifestyle can help you prevent high cholesterol. This lifestyle helps lower your risk for a heart attack and stroke. · Eat heart-healthy foods. ? Eat fruits, vegetables, whole grains (like oatmeal), dried beans and peas, nuts and seeds, soy products (like tofu), and fat-free or low-fat dairy products. ? Replace butter, margarine, and hydrogenated or partially hydrogenated oils with olive and canola oils. (Canola oil margarine without trans fat is fine.) ? Replace red meat with fish, poultry, and soy protein (like tofu). ? Limit processed and packaged foods like chips, crackers, and cookies. · Be active. Exercise can improve your cholesterol level. Get at least 30 minutes of exercise on most days of the week. Walking is a good choice. You also may want to do other activities, such as running, swimming, cycling, or playing tennis or team sports. · Stay at a healthy weight. Lose weight if you need to. · Don't smoke. If you need help quitting, talk to your doctor about stop-smoking programs and medicines. These can increase your chances of quitting for good. How is high cholesterol treated? The goal of treatment is to reduce your chances of having a heart attack or stroke. The goal is not to lower your cholesterol numbers only. · You may make lifestyle changes, such as eating healthy foods, not smoking, losing weight, and being more active. · You may have to take medicine. Follow-up care is a key part of your treatment and safety. Be sure to make and go to all appointments, and call your doctor if you are having problems. It's also a good idea to know your test results and keep a list of the medicines you take. Where can you learn more? Go to http://shreya-ann.info/. Enter H421 in the search box to learn more about \"Learning About High Cholesterol. \" Current as of: July 22, 2018 Content Version: 11.9 © 6164-0695 [employer], [employer]. Care instructions adapted under license by [employer] (which disclaims liability or warranty for this information). If you have questions about a medical condition or this instruction, always ask your healthcare professional. [address] any warranty or liability for your use of this information. Learning About the [address] Diet What is the Mediterranean diet? The Mediterranean diet is a style of eating rather than a diet plan. It features foods eaten in [place], [place], [place] and [place], and other countries along the [facility]. It emphasizes eating foods like fish, fruits, vegetables, beans, high-fiber breads and whole grains, nuts, and olive oil. This style of eating includes limited red meat, cheese, and sweets. Why choose the Mediterranean diet? A Mediterranean-style diet may improve heart health. It contains more fat than other heart-healthy diets. But the fats are mainly from nuts, unsaturated oils (such as fish oils and olive oil), and certain nut or seed oils (such as canola, soybean, or flaxseed oil). These fats may help protect the heart and blood vessels. How can you get started on the Mediterranean diet? Here are some things you can do to switch to a more Mediterranean way of eating. What to eat · Eat a variety of fruits and vegetables each day, such as grapes, blueberries, tomatoes, broccoli, peppers, figs, olives, spinach, eggplant, beans, lentils, and chickpeas. · Eat a variety of whole-grain foods each day, such as oats, brown rice, and whole wheat bread, pasta, and couscous. · Eat fish at least 2 times a week. Try tuna, salmon, mackerel, lake trout, herring, or sardines. · Eat moderate amounts of low-fat dairy products, such as milk, cheese, or yogurt. · Eat moderate amounts of poultry and eggs. · Choose healthy (unsaturated) fats, such as nuts, olive oil, and certain nut or seed oils like canola, soybean, and flaxseed. · Limit unhealthy (saturated) fats, such as butter, palm oil, and coconut oil. And limit fats found in animal products, such as meat and dairy products made with whole milk. Try to eat red meat only a few times a month in very small amounts. · Limit sweets and desserts to only a few times a week. This includes sugar-sweetened drinks like soda. The Mediterranean diet may also include red wine with your meal1 glass each day for women and up to 2 glasses a day for men. Tips for eating at home · Use herbs, spices, garlic, lemon zest, and citrus juice instead of salt to add flavor to foods. · Add avocado slices to your sandwich instead of botello. · Have fish for lunch or dinner instead of red meat. Brush the fish with olive oil, and broil or grill it. · Sprinkle your salad with seeds or nuts instead of cheese. · Cook with olive or canola oil instead of butter or oils that are high in saturated fat. · Switch from 2% milk or whole milk to 1% or fat-free milk. · Dip raw vegetables in a vinaigrette dressing or hummus instead of dips made from mayonnaise or sour cream. 
· Have a piece of fruit for dessert instead of a piece of cake. Try baked apples, or have some dried fruit. Tips for eating out · Try broiled, grilled, baked, or poached fish instead of having it fried or breaded. · Ask your  to have your meals prepared with olive oil instead of butter. · Order dishes made with marinara sauce or sauces made from olive oil. Avoid sauces made from cream or mayonnaise. · Choose whole-grain breads, whole wheat pasta and pizza crust, brown rice, beans, and lentils. · Cut back on butter or margarine on bread.  Instead, you can dip your bread in a small amount of olive oil. · Ask for a side salad or grilled vegetables instead of french fries or chips. Where can you learn more? Go to http://shreya-ann.info/. Enter [phone] in the search box to learn more about \"Learning About the Mediterranean Diet. \" Current as of: March 28, 2018 Content Version: 11.9 © 5421-9016 [employer]. Care instructions adapted under license by [employer] (which disclaims liability or warranty for this information). If you have questions about a medical condition or this instruction, always ask your healthcare professional. [address] any warranty or liability for your use of this information.

## 2019-01-23 NOTE — PROGRESS NOTES
[facility] 44 [facility] Chief Complaint Patient presents with  Hypertension  
  follow up 1. Have you been to the ER, urgent care clinic since your last visit? Hospitalized since your last visit? Yes When: 12/2018 Where: [facility] ED Reason for visit: Lesions on left shoulder 2. Have you seen or consulted any other health care providers outside of the [address] [place] [address] since your last visit? Include any pap smears or colon screening. No 
 
 
Health Maintenance Due Topic Date Due  
 DTaP/Tdap/Td series (1 - Tdap) 04/27/1997  PAP AKA CERVICAL CYTOLOGY  01/07/2016  Influenza Age 5 to Adult  08/01/2018 Refused influenza vaccine

## 2019-01-28 NOTE — PROGRESS NOTES
HISTORY OF PRESENT ILLNESS Suzette Kramer is a 44 y.o. female presents for hypertensin follow up HPI She is compliant with medical management, denies ADRs Physically active Tries to follow a healthy diet 
 
pap smear current Mammogram past due [place] Past Medical History:  
Diagnosis Date  Bipolar 1 disorder ([address]  Depression  Eczema  Elevated lipids  History of kidney stones Current Outpatient Medications on File Prior to Visit Medication Sig Dispense Refill  losartan (COZAAR) 100 mg tablet Take 1 Tab by mouth daily. 30 Tab 1  carBAMazepine XR (TEGRETOL XR) 200 mg SR tablet   0  
 fluPHENAZine (PROLIXIN) 5 mg tablet take 1 tablet by mouth at bedtime  0  
 ibuprofen (MOTRIN) 800 mg tablet Take 1 tablet by mouth every eight (8) hours as needed for Pain. 20 tablet 0 No current facility-administered medications on file prior to visit. Review of Systems Constitutional: Negative. Eyes: Negative. Respiratory: Negative. Cardiovascular: Negative. Gastrointestinal: Negative. Genitourinary: Negative. Visit Vitals /75 (BP 1 Location: Left arm, BP Patient Position: Sitting) Pulse 90 Temp 98.2 °F (36.8 °C) (Oral) Resp 18 Ht 5' 5.98\" (1.676 m) Wt 196 lb 9.6 oz (89.2 kg) LMP 12/28/2018 SpO2 100% BMI 31.75 kg/m² Physical Exam  
Constitutional: She is oriented to person, place, and time. She appears well-developed and well-nourished. No distress. Cardiovascular: Normal rate, regular rhythm and normal heart sounds. Pulmonary/Chest: Effort normal and breath sounds normal.  
Musculoskeletal: She exhibits no edema, tenderness or deformity. Neurological: She is alert and oriented to person, place, and time. Skin: Skin is warm and dry. She is not diaphoretic. Psychiatric: She has a normal mood and affect.  Her behavior is normal. Judgment and thought content normal.  
 
 
ASSESSMENT and PLAN 
  ICD-10-CM ICD-9-CM 1. Essential hypertension I10 401.9 2. Pure hypercholesterolemia E78.00 272.0 3. Encounter for screening mammogram for malignant neoplasm of breast Z12.31 V76.12 [facility] MAMMO BI SCREENING INCL CAD Follow-up Disposition: 
Return in about 3 months (around 4/23/2019) for htn. 
lab results and schedule of future lab studies reviewed with patient 
reviewed diet, exercise and weight control 
reviewed medications and side effects in detail Hypertension controlled. Continue current medications Patient voices understanding and acceptance of this advice and will call back if any further questions or concerns. An After Visit Summary was printed and given to the patient.

## 2019-01-29 ENCOUNTER — HOSPITAL ENCOUNTER (OUTPATIENT)
Dept: MAMMOGRAPHY | Age: 44
Discharge: HOME | End: 2019-01-29
Attending: NURSE PRACTITIONER
Payer: MEDICARE

## 2019-01-29 DIAGNOSIS — Z12.31 ENCOUNTER FOR SCREENING MAMMOGRAM FOR MALIGNANT NEOPLASM OF BREAST: ICD-10-CM

## 2019-01-29 PROCEDURE — 77067 SCR MAMMO BI INCL CAD: CPT

## 2019-04-23 ENCOUNTER — OFFICE VISIT (OUTPATIENT)
Dept: INTERNAL MEDICINE CLINIC | Age: 44
End: 2019-04-23

## 2019-04-23 VITALS
BODY MASS INDEX: 32.62 KG/M2 | RESPIRATION RATE: 19 BRPM | OXYGEN SATURATION: 100 % | HEART RATE: 68 BPM | SYSTOLIC BLOOD PRESSURE: 124 MMHG | WEIGHT: 203 LBS | DIASTOLIC BLOOD PRESSURE: 74 MMHG | TEMPERATURE: 98 F | HEIGHT: 66 IN

## 2019-04-23 DIAGNOSIS — I10 ESSENTIAL HYPERTENSION: Primary | ICD-10-CM

## 2019-04-23 DIAGNOSIS — E78.00 PURE HYPERCHOLESTEROLEMIA: ICD-10-CM

## 2019-04-23 RX ORDER — FLUPHENAZINE HYDROCHLORIDE 10 MG/1
TABLET ORAL
Refills: 0 | COMMUNITY
Start: 2019-04-15 | End: 2021-04-21 | Stop reason: SDUPTHER

## 2019-04-23 NOTE — PROGRESS NOTES
Exam room 8  Cheryl Ca is a 44 y.o. female  Chief Complaint   Patient presents with    Hypertension     follow up   [facility] Follow-up     Visit Vitals  /74 (BP 1 Location: Left arm, BP Patient Position: Sitting)   Pulse 68   Temp 98 °F (36.7 °C) (Oral)   Resp 19   Ht 5' 5.98\" (1.676 m)   Wt 203 lb (92.1 kg)   SpO2 100%   BMI 32.78 kg/m²     1. Have you been to the ER, urgent care clinic since your last visit? Hospitalized since your last visit? Yes, [facility], March 3, 2019, Bipolar mental issue, admitted for two and half weeks    2. Have you seen or consulted any other health care providers outside of the [address] [place] [address] since your last visit? Include any pap smears or colon screening.  No  Health Maintenance Due   Topic Date Due    DTaP/Tdap/Td series (1 - Tdap) 04/27/1997    PAP AKA CERVICAL CYTOLOGY  01/07/2016     Learning Assessment 2/22/2016   PRIMARY LEARNER Patient   HIGHEST LEVEL OF EDUCATION - PRIMARY LEARNER  SOME COLLEGE   BARRIERS PRIMARY LEARNER NONE   CO-LEARNER CAREGIVER No   PRIMARY LANGUAGE ENGLISH   LEARNER PREFERENCE PRIMARY READING   ANSWERED BY patient   RELATIONSHIP SELF     3 most recent PHQ Screens 4/23/2019   PHQ Not Done -   Little interest or pleasure in doing things More than half the days   Feeling down, depressed, irritable, or hopeless More than half the days   Total Score PHQ 2 4

## 2019-04-23 NOTE — PROGRESS NOTES
HISTORY OF PRESENT ILLNESS  Hector Carney is a 44 y.o. female presents for hypertension follow up  HPI  She exercises everyday    Follow a healthy diet; no fast foods, dairy or red meat    Compliant with medical management    Started fish oil for cholesterol lowering    No interval change in medical management    Past Medical History:   Diagnosis Date    Bipolar 1 disorder ([address]     Depression     Eczema     Elevated lipids     History of kidney stones        Current Outpatient Medications on File Prior to Visit   Medication Sig Dispense Refill    losartan (COZAAR) 100 mg tablet take 1 tablet by mouth once daily 30 Tab 3    carBAMazepine XR (TEGRETOL XR) 200 mg SR tablet   0    fluPHENAZine (PROLIXIN) 5 mg tablet take 1 tablet by mouth at bedtime  0    ibuprofen (MOTRIN) 800 mg tablet Take 1 tablet by mouth every eight (8) hours as needed for Pain. 20 tablet 0    fluPHENAZine (PROLIXIN) 10 mg tablet take 1 tablet by mouth twice a day for 21 DAYS  0     No current facility-administered medications on file prior to visit. Review of Systems   Constitutional: Negative. Eyes: Negative. Respiratory: Negative. Cardiovascular: Negative. Gastrointestinal: Negative. Genitourinary: Negative. Musculoskeletal: Negative. Neurological: Negative. Visit Vitals  /74 (BP 1 Location: Left arm, BP Patient Position: Sitting)   Pulse 68   Temp 98 °F (36.7 °C) (Oral)   Resp 19   Ht 5' 5.98\" (1.676 m)   Wt 203 lb (92.1 kg)   LMP 04/07/2019 (Exact Date)   SpO2 100%   BMI 32.78 kg/m²     Physical Exam   Constitutional: She is oriented to person, place, and time. She appears well-developed and well-nourished. No distress. Neck: No JVD present. Carotid bruit is not present. Cardiovascular: Normal rate and regular rhythm. Pulmonary/Chest: Effort normal and breath sounds normal.   Abdominal: Soft. Bowel sounds are normal.   Musculoskeletal: She exhibits no edema or tenderness. Neurological: She is alert and oriented to person, place, and time. No cranial nerve deficit. Skin: Skin is warm and dry. She is not diaphoretic. Psychiatric: She has a normal mood and affect. ASSESSMENT and PLAN    ICD-10-CM ICD-9-CM    1. Essential hypertension I10 401.9    2. Pure hypercholesterolemia E78.00 272.0      Follow-up and Dispositions    · Return in about 3 months (around 7/23/2019) for htn, hyperlipidemia, fasting labs. lab results and schedule of future lab studies reviewed with patient  reviewed diet, exercise and weight control  reviewed medications and side effects in detail    Discussed the patient's BMI with her. The BMI follow up plan is as follows:     dietary management education, guidance, and counseling  encourage exercise  monitor weight      Patient voices understanding and acceptance of this advice and will call back if any further questions or concerns. An After Visit Summary was printed and given to the patient.

## 2019-04-23 NOTE — PATIENT INSTRUCTIONS
High Cholesterol: Care Instructions  Your Care Instructions    Cholesterol is a type of fat in your blood. It is needed for many body functions, such as making new cells. Cholesterol is made by your body. It also comes from food you eat. High cholesterol means that you have too much of the fat in your blood. This raises your risk of a heart attack and stroke. LDL and HDL are part of your total cholesterol. LDL is the \"bad\" cholesterol. High LDL can raise your risk for heart disease, heart attack, and stroke. HDL is the \"good\" cholesterol. It helps clear bad cholesterol from the body. High HDL is linked with a lower risk of heart disease, heart attack, and stroke. Your cholesterol levels help your doctor find out your risk for having a heart attack or stroke. You and your doctor can talk about whether you need to lower your risk and what treatment is best for you. A heart-healthy lifestyle along with medicines can help lower your cholesterol and your risk. The way you choose to lower your risk will depend on how high your risk is for heart attack and stroke. It will also depend on how you feel about taking medicines. Follow-up care is a key part of your treatment and safety. Be sure to make and go to all appointments, and call your doctor if you are having problems. It's also a good idea to know your test results and keep a list of the medicines you take. How can you care for yourself at home? · Eat a variety of foods every day. Good choices include fruits, vegetables, whole grains (like oatmeal), dried beans and peas, nuts and seeds, soy products (like tofu), and fat-free or low-fat dairy products. · Replace butter, margarine, and hydrogenated or partially hydrogenated oils with olive and canola oils. (Canola oil margarine without trans fat is fine.)  · Replace red meat with fish, poultry, and soy protein (like tofu). · Limit processed and packaged foods like chips, crackers, and cookies.   · Bake, broil, or steam foods. Don't jose them. · Be physically active. Get at least 30 minutes of exercise on most days of the week. Walking is a good choice. You also may want to do other activities, such as running, swimming, cycling, or playing tennis or team sports. · Stay at a healthy weight or lose weight by making the changes in eating and physical activity listed above. Losing just a small amount of weight, even 5 to 10 pounds, can reduce your risk for having a heart attack or stroke. · Do not smoke. When should you call for help? Watch closely for changes in your health, and be sure to contact your doctor if:    · You need help making lifestyle changes.     · You have questions about your medicine. Where can you learn more? Go to http://shreya-ann.info/. Enter C983 in the search box to learn more about \"High Cholesterol: Care Instructions. \"  Current as of: July 22, 2018  Content Version: 11.9  © 2024-9934 [employer]. Care instructions adapted under license by [employer] (which disclaims liability or warranty for this information). If you have questions about a medical condition or this instruction, always ask your healthcare professional. [place] [address] any warranty or liability for your use of this information. Learning About High Blood Pressure  What is high blood pressure? Blood pressure is a measure of how hard the blood pushes against the walls of your arteries. It's normal for blood pressure to go up and down throughout the day, but if it stays up, you have high blood pressure. Another name for high blood pressure is hypertension. Two numbers tell you your blood pressure. The first number is the systolic pressure. It shows how hard the blood pushes when your heart is pumping. The second number is the diastolic pressure.  It shows how hard the blood pushes between heartbeats, when your heart is relaxed and filling with blood.  Your doctor will give you a goal for your blood pressure. Your goal will be based on your health and your age. High blood pressure (hypertension) means that the top number stays high, or the bottom number stays high, or both. High blood pressure increases the risk of stroke, heart attack, and other problems. You and your doctor will talk about your risks of these problems based on your blood pressure. What happens when you have high blood pressure? · Blood flows through your arteries with too much force. Over time, this damages the walls of your arteries. But you can't feel it. High blood pressure usually doesn't cause symptoms. · Fat and calcium start to build up in your arteries. This buildup is called plaque. Plaque makes your arteries narrower and stiffer. Blood can't flow through them as easily. · This lack of good blood flow starts to damage some of the organs in your body. This can lead to problems such as coronary artery disease and heart attack, heart failure, stroke, kidney failure, and eye damage. How can you prevent high blood pressure? · Stay at a healthy weight. · Try to limit how much sodium you eat to less than 2,300 milligrams (mg) a day. If you limit your sodium to 1,500 mg a day, you can lower your blood pressure even more. ? Buy foods that are labeled \"unsalted,\" \"sodium-free,\" or \"low-sodium. \" Foods labeled \"reduced-sodium\" and \"light sodium\" may still have too much sodium. ? Flavor your food with garlic, lemon juice, onion, vinegar, herbs, and spices instead of salt. Do not use soy sauce, steak sauce, onion salt, garlic salt, mustard, or ketchup on your food. ? Use less salt (or none) when recipes call for it. You can often use half the salt a recipe calls for without losing flavor. · Be physically active. Get at least 30 minutes of exercise on most days of the week. Walking is a good choice.  You also may want to do other activities, such as running, swimming, cycling, or playing tennis or team sports. · Limit alcohol to 2 drinks a day for men and 1 drink a day for women. · Eat plenty of fruits, vegetables, and low-fat dairy products. Eat less saturated and total fats. How is high blood pressure treated? · Your doctor will suggest making lifestyle changes. For example, your doctor may ask you to eat healthy foods, quit smoking, lose extra weight, and be more active. · If lifestyle changes don't help enough, your doctor may recommend that you take medicine. · When blood pressure is very high, medicines are needed to lower it. Follow-up care is a key part of your treatment and safety. Be sure to make and go to all appointments, and call your doctor if you are having problems. It's also a good idea to know your test results and keep a list of the medicines you take. Where can you learn more? Go to http://shreya-ann.info/. Enter P501 in the search box to learn more about \"Learning About High Blood Pressure. \"  Current as of: July 22, 2018  Content Version: 11.9  © 8739-8432 [employer]. Care instructions adapted under license by [employer] (which disclaims liability or warranty for this information). If you have questions about a medical condition or this instruction, always ask your healthcare professional. [address] any warranty or liability for your use of this information. Body Mass Index: Care Instructions  Your Care Instructions    Body mass index (BMI) can help you see if your weight is raising your risk for health problems. It uses a formula to compare how much you weigh with how tall you are. · A BMI lower than 18.5 is considered underweight. · A BMI between 18.5 and 24.9 is considered healthy. · A BMI between 25 and 29.9 is considered overweight. A BMI of 30 or higher is considered obese.   If your BMI is in the normal range, it means that you have a lower risk for weight-related health problems. If your BMI is in the overweight or obese range, you may be at increased risk for weight-related health problems, such as high blood pressure, heart disease, stroke, arthritis or joint pain, and diabetes. If your BMI is in the underweight range, you may be at increased risk for health problems such as fatigue, lower protection (immunity) against illness, muscle loss, bone loss, hair loss, and hormone problems. BMI is just one measure of your risk for weight-related health problems. You may be at higher risk for health problems if you are not active, you eat an unhealthy diet, or you drink too much alcohol or use tobacco products. Follow-up care is a key part of your treatment and safety. Be sure to make and go to all appointments, and call your doctor if you are having problems. It's also a good idea to know your test results and keep a list of the medicines you take. How can you care for yourself at home? · Practice healthy eating habits. This includes eating plenty of fruits, vegetables, whole grains, lean protein, and low-fat dairy. · If your doctor recommends it, get more exercise. Walking is a good choice. Bit by bit, increase the amount you walk every day. Try for at least 30 minutes on most days of the week. · Do not smoke. Smoking can increase your risk for health problems. If you need help quitting, talk to your doctor about stop-smoking programs and medicines. These can increase your chances of quitting for good. · Limit alcohol to 2 drinks a day for men and 1 drink a day for women. Too much alcohol can cause health problems. If you have a BMI higher than 25  · Your doctor may do other tests to check your risk for weight-related health problems. This may include measuring the distance around your waist. A waist measurement of more than 40 inches in men or 35 inches in women can increase the risk of weight-related health problems.   · Talk with your doctor about steps you can take to stay healthy or improve your health. You may need to make lifestyle changes to lose weight and stay healthy, such as changing your diet and getting regular exercise. If you have a BMI lower than 18.5  · Your doctor may do other tests to check your risk for health problems. · Talk with your doctor about steps you can take to stay healthy or improve your health. You may need to make lifestyle changes to gain or maintain weight and stay healthy, such as getting more healthy foods in your diet and doing exercises to build muscle. Where can you learn more? Go to http://shreya-ann.info/. Enter S176 in the search box to learn more about \"Body Mass Index: Care Instructions. \"  Current as of: October 13, 2016  Content Version: 11.4  © 8740-5310 [employer], [employer]. Care instructions adapted under license by [employer] (which disclaims liability or warranty for this information). If you have questions about a medical condition or this instruction, always ask your healthcare professional. [address] any warranty or liability for your use of this information.

## 2019-05-22 ENCOUNTER — OFFICE VISIT (OUTPATIENT)
Dept: SURGERY | Age: 44
End: 2019-05-22

## 2019-05-22 ENCOUNTER — HOSPITAL ENCOUNTER (OUTPATIENT)
Dept: LAB | Age: 44
Discharge: HOME | End: 2019-05-22
Payer: MEDICARE

## 2019-05-22 VITALS
OXYGEN SATURATION: 97 % | BODY MASS INDEX: 32.53 KG/M2 | SYSTOLIC BLOOD PRESSURE: 136 MMHG | HEART RATE: 93 BPM | DIASTOLIC BLOOD PRESSURE: 81 MMHG | WEIGHT: 202.4 LBS | HEIGHT: 66 IN | TEMPERATURE: 98.4 F | RESPIRATION RATE: 18 BRPM

## 2019-05-22 DIAGNOSIS — N89.8 VAGINAL ODOR: ICD-10-CM

## 2019-05-22 DIAGNOSIS — N89.8 VAGINAL DISCHARGE: Primary | ICD-10-CM

## 2019-05-22 DIAGNOSIS — Z12.4 PAP SMEAR FOR CERVICAL CANCER SCREENING: ICD-10-CM

## 2019-05-22 PROCEDURE — 88175 CYTOPATH C/V AUTO FLUID REDO: CPT

## 2019-05-22 NOTE — PROGRESS NOTES
Chief Complaint   Patient presents with    Vaginitis     Pt reports she had a tampon left in her for 3 days last week and reports she had a vaginal odor and itch after that pt would like to make sure everything has cleared up. Pt reports her last pap was 3 years ago. 3 most recent PHQ Screens 5/22/2019   PHQ Not Done -   Little interest or pleasure in doing things Not at all   Feeling down, depressed, irritable, or hopeless Not at all   Total Score PHQ 2 0     1. Have you been to the ER, urgent care clinic since your last visit? Hospitalized since your last visit? No    2. Have you seen or consulted any other health care providers outside of the [address] [place] [address] since your last visit? Include any pap smears or colon screening.  No

## 2019-05-22 NOTE — PROGRESS NOTES
Vaginitis evaluation    Chief Complaint   Vaginitis      HPI  38 y.o. female for vaginal discharge and odor after leaving in a tampon for 3 days. Odor and discharge went away a couple of days ago. Tampon came out last week after BM. Mild lower back pain, which has resolved. No fever/chills, nausea/vomitting. Did have some mild itching. Used monostat for 4 days, and problem subsided. She denies additional symptoms at this time. The patient denies aggravating factors. She is not concerned about possible STI exposure at this time. She denies exposure to new chemicals or hygenic agents  Previous treatment included: none    Last pap smear was 3 years ago. No hx abnormal paps. Past Medical History:   Diagnosis Date    Bipolar 1 disorder ([address]     Depression     Eczema     Elevated lipids     History of kidney stones      Past Surgical History:   Procedure Laterality Date    HX LITHOTRIPSY       Social History     Occupational History    Not on file   Tobacco Use    Smoking status: Never Smoker    Smokeless tobacco: Never Used   Substance and Sexual Activity    Alcohol use: No    Drug use: No    Sexual activity: Not Currently     Family History   Problem Relation Age of Onset    Cancer Mother     Lung Disease Mother     Diabetes Father         Allergies   Allergen Reactions    Avocado Hives    Maycol Seed Hives    Geodon [Ziprasidone Hcl] Other (comments)     Pt states it makes her want to commit sucide.  Haldol [Haloperidol Lactate] Other (comments)     Extreme body aches    Penicillins Other (comments)     Allergy testing    Sulfur Other (comments)     Allergy testing     Prior to Admission medications    Medication Sig Start Date End Date Taking?  Authorizing Provider   fluPHENAZine (PROLIXIN) 10 mg tablet take 1 tablet by mouth twice a day for 21 DAYS 4/15/19  Yes Provider, Historical   carBAMazepine XR (TEGRETOL XR) 200 mg SR tablet  12/20/18  Yes Provider, Historical fluPHENAZine (PROLIXIN) 5 mg tablet take 1 tablet by mouth at bedtime 12/19/18  Yes Provider, Historical   ibuprofen (MOTRIN) 800 mg tablet Take 1 tablet by mouth every eight (8) hours as needed for Pain. 1/2/15  Yes Barbara Lane MD   losartan (COZAAR) 100 mg tablet take 1 tablet by mouth once daily 2/6/19   Sharon Perdue, NP                      Review of Systems - History obtained from the patient  Constitutional: negative for weight loss, fever, night sweats  Breast: negative for breast lumps, nipple discharge, galactorrhea  GI: negative for change in bowel habits, abdominal pain, black or bloody stools  : negative for frequency, dysuria, hematuria  MSK: negative for back pain, joint pain, muscle pain  Skin: negative for itching, rash, hives  Neuro: negative for dizziness, headache, confusion, weakness  Psych: negative for anxiety, depression, change in mood  Heme/lymph: negative for bleeding, bruising, pallor       Objective:    Visit Vitals  /81 (BP 1 Location: Right arm, BP Patient Position: Sitting)   Pulse 93   Temp 98.4 °F (36.9 °C) (Oral)   Resp 18   Ht 5' 5.98\" (1.676 m)   Wt 202 lb 6.4 oz (91.8 kg)   LMP 05/05/2019   SpO2 97%   BMI 32.69 kg/m²       Physical Exam:   PHYSICAL EXAMINATION    Constitutional  · Appearance: well-nourished, well developed, alert, in no acute distress    HENT  · Head and Face: appears normal    Genitourinary  · External Genitalia: normal appearance for age, no  discharge present, no tenderness present, no inflammatory lesions present, no masses present, no atrophy present  · Vagina:   Moderate clear discharge present, otherwise normal vaginal vault without central or paravaginal defects, no inflammatory lesions present, no masses present  · Bladder: non-tender to palpation  · Urethra: appears normal  · Cervix: normal   · Uterus: normal size, shape and consistency  · Adnexa: no adnexal tenderness present, no adnexal masses present  · Perineum: perineum within normal limits, no evidence of trauma, no rashes or skin lesions present  · Anus: anus within normal limits, no hemorrhoids present  · Inguinal Lymph Nodes: no lymphadenopathy present    Skin  · General Inspection: no rash, no lesions identified    Neurologic/Psychiatric  · Mental Status:  · Orientation: grossly oriented to person, place and time  · Mood and Affect: mood normal, affect appropriate          No results found for any visits on 05/22/19. Assessment:   38 y.o. with vaginal discharge    Plan:   - nuswab. Will call with results  - pap smear today    ROV prn if symptoms persist or worsen.  Or for annual exam

## 2019-05-24 LAB
A VAGINAE DNA VAG QL NAA+PROBE: ABNORMAL SCORE
BVAB2 DNA VAG QL NAA+PROBE: ABNORMAL SCORE
C ALBICANS DNA VAG QL NAA+PROBE: NEGATIVE
C GLABRATA DNA VAG QL NAA+PROBE: NEGATIVE
MEGA1 DNA VAG QL NAA+PROBE: ABNORMAL SCORE
T VAGINALIS RRNA SPEC QL NAA+PROBE: NEGATIVE

## 2019-05-24 RX ORDER — METRONIDAZOLE 500 MG/1
500 TABLET ORAL 2 TIMES DAILY
Qty: 14 TAB | Refills: 0 | Status: SHIPPED | OUTPATIENT
Start: 2019-05-24 | End: 2019-05-31

## 2019-05-28 ENCOUNTER — TELEPHONE (OUTPATIENT)
Dept: OBGYN CLINIC | Age: 44
End: 2019-05-28

## 2019-10-01 ENCOUNTER — APPOINTMENT (OUTPATIENT)
Dept: CT IMAGING | Age: 44
End: 2019-10-01
Attending: EMERGENCY MEDICINE
Payer: MEDICARE

## 2019-10-01 ENCOUNTER — HOSPITAL ENCOUNTER (EMERGENCY)
Age: 44
Discharge: HOME | End: 2019-10-01
Attending: EMERGENCY MEDICINE | Admitting: EMERGENCY MEDICINE
Payer: MEDICARE

## 2019-10-01 VITALS
RESPIRATION RATE: 16 BRPM | SYSTOLIC BLOOD PRESSURE: 148 MMHG | OXYGEN SATURATION: 95 % | TEMPERATURE: 98.4 F | DIASTOLIC BLOOD PRESSURE: 87 MMHG | HEART RATE: 63 BPM

## 2019-10-01 DIAGNOSIS — N30.00 ACUTE CYSTITIS WITHOUT HEMATURIA: Primary | ICD-10-CM

## 2019-10-01 DIAGNOSIS — N13.30 HYDRONEPHROSIS, UNSPECIFIED HYDRONEPHROSIS TYPE: ICD-10-CM

## 2019-10-01 LAB
ALBUMIN SERPL-MCNC: 3.4 G/DL (ref 3.5–5)
ALBUMIN/GLOB SERPL: 0.7 {RATIO} (ref 1.1–2.2)
ALP SERPL-CCNC: 274 U/L (ref 45–117)
ALT SERPL-CCNC: 105 U/L (ref 12–78)
ANION GAP SERPL CALC-SCNC: 6 MMOL/L (ref 5–15)
APPEARANCE UR: ABNORMAL
AST SERPL-CCNC: 70 U/L (ref 15–37)
BACTERIA URNS QL MICRO: NEGATIVE /HPF
BASOPHILS # BLD: 0.1 K/UL (ref 0–0.1)
BASOPHILS NFR BLD: 1 % (ref 0–1)
BILIRUB SERPL-MCNC: 0.4 MG/DL (ref 0.2–1)
BILIRUB UR QL: NEGATIVE
BUN SERPL-MCNC: 8 MG/DL (ref 6–20)
BUN/CREAT SERPL: 9 (ref 12–20)
CALCIUM SERPL-MCNC: 9.9 MG/DL (ref 8.5–10.1)
CHLORIDE SERPL-SCNC: 103 MMOL/L (ref 97–108)
CO2 SERPL-SCNC: 30 MMOL/L (ref 21–32)
COLOR UR: ABNORMAL
COMMENT, HOLDF: NORMAL
CREAT SERPL-MCNC: 0.85 MG/DL (ref 0.55–1.02)
DIFFERENTIAL METHOD BLD: ABNORMAL
EOSINOPHIL # BLD: 0.2 K/UL (ref 0–0.4)
EOSINOPHIL NFR BLD: 2 % (ref 0–7)
EPITH CASTS URNS QL MICRO: ABNORMAL /LPF
ERYTHROCYTE [DISTWIDTH] IN BLOOD BY AUTOMATED COUNT: 12 % (ref 11.5–14.5)
GLOBULIN SER CALC-MCNC: 4.9 G/DL (ref 2–4)
GLUCOSE SERPL-MCNC: 114 MG/DL (ref 65–100)
GLUCOSE UR STRIP.AUTO-MCNC: NEGATIVE MG/DL
HCG UR QL: NEGATIVE
HCT VFR BLD AUTO: 42.8 % (ref 35–47)
HGB BLD-MCNC: 14.1 G/DL (ref 11.5–16)
HGB UR QL STRIP: ABNORMAL
IMM GRANULOCYTES # BLD AUTO: 0.1 K/UL (ref 0–0.04)
IMM GRANULOCYTES NFR BLD AUTO: 1 % (ref 0–0.5)
KETONES UR QL STRIP.AUTO: NEGATIVE MG/DL
LEUKOCYTE ESTERASE UR QL STRIP.AUTO: ABNORMAL
LYMPHOCYTES # BLD: 2.7 K/UL (ref 0.8–3.5)
LYMPHOCYTES NFR BLD: 20 % (ref 12–49)
MCH RBC QN AUTO: 30 PG (ref 26–34)
MCHC RBC AUTO-ENTMCNC: 32.9 G/DL (ref 30–36.5)
MCV RBC AUTO: 91.1 FL (ref 80–99)
MONOCYTES # BLD: 0.5 K/UL (ref 0–1)
MONOCYTES NFR BLD: 4 % (ref 5–13)
NEUTS SEG # BLD: 10.3 K/UL (ref 1.8–8)
NEUTS SEG NFR BLD: 72 % (ref 32–75)
NITRITE UR QL STRIP.AUTO: NEGATIVE
NRBC # BLD: 0 K/UL (ref 0–0.01)
NRBC BLD-RTO: 0 PER 100 WBC
PH UR STRIP: 6.5 [PH] (ref 5–8)
PLATELET # BLD AUTO: 285 K/UL (ref 150–400)
PMV BLD AUTO: 8 FL (ref 8.9–12.9)
POTASSIUM SERPL-SCNC: 3.4 MMOL/L (ref 3.5–5.1)
PROT SERPL-MCNC: 8.3 G/DL (ref 6.4–8.2)
PROT UR STRIP-MCNC: 100 MG/DL
RBC # BLD AUTO: 4.7 M/UL (ref 3.8–5.2)
RBC #/AREA URNS HPF: ABNORMAL /HPF (ref 0–5)
SAMPLES BEING HELD,HOLD: NORMAL
SODIUM SERPL-SCNC: 139 MMOL/L (ref 136–145)
SP GR UR REFRACTOMETRY: 1.01 (ref 1–1.03)
UA: UC IF INDICATED,UAUC: ABNORMAL
UROBILINOGEN UR QL STRIP.AUTO: 0.2 EU/DL (ref 0.2–1)
WBC # BLD AUTO: 13.9 K/UL (ref 3.6–11)
WBC URNS QL MICRO: ABNORMAL /HPF (ref 0–4)

## 2019-10-01 PROCEDURE — 80053 COMPREHEN METABOLIC PANEL: CPT

## 2019-10-01 PROCEDURE — 74176 CT ABD & PELVIS W/O CONTRAST: CPT

## 2019-10-01 PROCEDURE — 87086 URINE CULTURE/COLONY COUNT: CPT

## 2019-10-01 PROCEDURE — 87077 CULTURE AEROBIC IDENTIFY: CPT

## 2019-10-01 PROCEDURE — 74011250636 HC RX REV CODE- 250/636: Performed by: EMERGENCY MEDICINE

## 2019-10-01 PROCEDURE — 96375 TX/PRO/DX INJ NEW DRUG ADDON: CPT

## 2019-10-01 PROCEDURE — 81025 URINE PREGNANCY TEST: CPT

## 2019-10-01 PROCEDURE — 87186 SC STD MICRODIL/AGAR DIL: CPT

## 2019-10-01 PROCEDURE — 96374 THER/PROPH/DIAG INJ IV PUSH: CPT

## 2019-10-01 PROCEDURE — 99284 EMERGENCY DEPT VISIT MOD MDM: CPT

## 2019-10-01 PROCEDURE — 74011250637 HC RX REV CODE- 250/637: Performed by: EMERGENCY MEDICINE

## 2019-10-01 PROCEDURE — 81001 URINALYSIS AUTO W/SCOPE: CPT

## 2019-10-01 PROCEDURE — 85025 COMPLETE CBC W/AUTO DIFF WBC: CPT

## 2019-10-01 PROCEDURE — 36415 COLL VENOUS BLD VENIPUNCTURE: CPT

## 2019-10-01 RX ORDER — KETOROLAC TROMETHAMINE 30 MG/ML
30 INJECTION, SOLUTION INTRAMUSCULAR; INTRAVENOUS
Status: COMPLETED | OUTPATIENT
Start: 2019-10-01 | End: 2019-10-01

## 2019-10-01 RX ORDER — ONDANSETRON 8 MG/1
8 TABLET, ORALLY DISINTEGRATING ORAL
Qty: 10 TAB | Refills: 0 | Status: SHIPPED | OUTPATIENT
Start: 2019-10-01 | End: 2020-02-03

## 2019-10-01 RX ORDER — IBUPROFEN 800 MG/1
800 TABLET ORAL
Qty: 20 TAB | Refills: 0 | Status: SHIPPED | OUTPATIENT
Start: 2019-10-01 | End: 2019-10-08

## 2019-10-01 RX ORDER — ONDANSETRON 2 MG/ML
8 INJECTION INTRAMUSCULAR; INTRAVENOUS
Status: COMPLETED | OUTPATIENT
Start: 2019-10-01 | End: 2019-10-01

## 2019-10-01 RX ORDER — CEFDINIR 300 MG/1
300 CAPSULE ORAL
Status: COMPLETED | OUTPATIENT
Start: 2019-10-01 | End: 2019-10-01

## 2019-10-01 RX ORDER — AMOXICILLIN AND CLAVULANATE POTASSIUM 875; 125 MG/1; MG/1
1 TABLET, FILM COATED ORAL 2 TIMES DAILY
Qty: 20 TAB | Refills: 0 | Status: SHIPPED | OUTPATIENT
Start: 2019-10-01 | End: 2019-10-11

## 2019-10-01 RX ORDER — IBUPROFEN 800 MG/1
800 TABLET ORAL
Qty: 20 TAB | Refills: 0 | Status: SHIPPED | OUTPATIENT
Start: 2019-10-01 | End: 2019-10-01

## 2019-10-01 RX ADMIN — ONDANSETRON 8 MG: 2 INJECTION INTRAMUSCULAR; INTRAVENOUS at 04:18

## 2019-10-01 RX ADMIN — SODIUM CHLORIDE 1000 ML: 900 INJECTION, SOLUTION INTRAVENOUS at 04:19

## 2019-10-01 RX ADMIN — KETOROLAC TROMETHAMINE 30 MG: 30 INJECTION, SOLUTION INTRAMUSCULAR at 04:18

## 2019-10-01 RX ADMIN — CEFDINIR 300 MG: 300 CAPSULE ORAL at 05:15

## 2019-10-01 NOTE — ED TRIAGE NOTES
Triage note :  Pt arrives via ems from home with c/o uti for 3 weeks , back pain , and kidney pain . Pt is currently being treated with an antibiotic . Pt is a&o  x4 . Pt ambulated to the room from the stretcher .

## 2019-10-01 NOTE — ED NOTES
Pt reports has been taking her antibiotic and over the last 3 days her \"kidney \" pain has gotten worse . Pt reports 9/10 pain in right and left flank pain .  Pt was seen and diagnosed at [facility] ED

## 2019-10-01 NOTE — DISCHARGE INSTRUCTIONS
Patient Education     You have a urinary tract infection and signs on cat scan that you likely passed a kidney stone. I have prescribed a stronger antibiotic that you take two times a day- YOU MUST TAKE THIS AS PRESCRIBED. I also prescribed high dose ibuprofen for pain as well as Zofran for nausea. Take all the medications as prescribed. Please call and arrange close follow up with the Urologist (kidney specialist) to follow up with the hydronephrosis to be sure it has resolved. Return to the ED if you develop a fever over 100.5, increased pain or vomiting. Urinary Tract Infection in Women: Care Instructions  Your Care Instructions    A urinary tract infection, or UTI, is a general term for an infection anywhere between the kidneys and the urethra (where urine comes out). Most UTIs are bladder infections. They often cause pain or burning when you urinate. UTIs are caused by bacteria and can be cured with antibiotics. Be sure to complete your treatment so that the infection goes away. Follow-up care is a key part of your treatment and safety. Be sure to make and go to all appointments, and call your doctor if you are having problems. It's also a good idea to know your test results and keep a list of the medicines you take. How can you care for yourself at home? · Take your antibiotics as directed. Do not stop taking them just because you feel better. You need to take the full course of antibiotics. · Drink extra water and other fluids for the next day or two. This may help wash out the bacteria that are causing the infection. (If you have kidney, heart, or liver disease and have to limit fluids, talk with your doctor before you increase your fluid intake.)  · Avoid drinks that are carbonated or have caffeine. They can irritate the bladder. · Urinate often. Try to empty your bladder each time. · To relieve pain, take a hot bath or lay a heating pad set on low over your lower belly or genital area. Never go to sleep with a heating pad in place. To prevent UTIs  · Drink plenty of water each day. This helps you urinate often, which clears bacteria from your system. (If you have kidney, heart, or liver disease and have to limit fluids, talk with your doctor before you increase your fluid intake.)  · Urinate when you need to. · Urinate right after you have sex. · Change sanitary pads often. · Avoid douches, bubble baths, feminine hygiene sprays, and other feminine hygiene products that have deodorants. · After going to the bathroom, wipe from front to back. When should you call for help? Call your doctor now or seek immediate medical care if:    · Symptoms such as fever, chills, nausea, or vomiting get worse or appear for the first time.     · You have new pain in your back just below your rib cage. This is called flank pain.     · There is new blood or pus in your urine.     · You have any problems with your antibiotic medicine.    Watch closely for changes in your health, and be sure to contact your doctor if:    · You are not getting better after taking an antibiotic for 2 days.     · Your symptoms go away but then come back. Where can you learn more? Go to http://shreya-ann.info/. Enter U609 in the search box to learn more about \"Urinary Tract Infection in Women: Care Instructions. \"  Current as of: December 19, 2018  Content Version: 12.2  © 9337-9943 [employer]. Care instructions adapted under license by [employer] (which disclaims liability or warranty for this information). If you have questions about a medical condition or this instruction, always ask your healthcare professional. [place] [address] any warranty or liability for your use of this information. Patient Education        Learning About Hydronephrosis  What is hydronephrosis? Hydronephrosis is swelling of the kidneys. It is caused by a buildup of urine.  This condition can happen if a tube that drains urine from your kidneys is blocked. The blockage can come from within the urinary tract or from pressure outside of the tract. Pregnancy is an example of an outside (external) cause. This condition is often caused by a blockage such as a kidney stone, tumor, or blood clot. It also can be caused by a problem in your urinary system that you were born with (congenital problem). What are the symptoms? Some of the common symptoms are:  · Pain in one or both sides. · Stomach pain. · Blood in your urine. Some people have no symptoms. How is it diagnosed? Your doctor will do an ultrasound to look for a blockage in your urinary system. An ultrasound allows your doctor to see a picture of the organs and other structures in your belly (abdomen). You also may need blood and urine tests. How is it treated? Your treatment depends on the cause of the swelling. If it is caused by a blockage, your treatment will depend on the type of blockage you have. If the blockage is caused by a kidney stone, you may wait for the stone to pass. If hydronephrosis happens during pregnancy, it usually clears up on its own. You may need to have urine drained from your bladder or kidneys. A urinary catheter is a small, flexible tube that can be inserted through the urethra and into the bladder, allowing urine to drain. A nephrostomy catheter is a thin tube placed into your kidney to drain urine. Sometimes surgery is needed to clear the blockage. If you have a blockage, you should begin to feel better after the blockage is gone. Many people recover and have no long-term problems. But some may have kidney damage. If hydronephrosis was left untreated for a long time, the damage can be severe. Severe damage will require further treatment. Follow-up care is a key part of your treatment and safety. Be sure to make and go to all appointments, and call your doctor if you are having problems.  It's also a good idea to know your test results and keep a list of the medicines you take. Where can you learn more? Go to http://shreya-ann.info/. Enter S386 in the search box to learn more about \"Learning About Hydronephrosis. \"  Current as of: October 31, 2018  Content Version: 12.2  © 6532-0844 [employer]. Care instructions adapted under license by [employer] (which disclaims liability or warranty for this information). If you have questions about a medical condition or this instruction, always ask your healthcare professional. [address] any warranty or liability for your use of this information.

## 2019-10-01 NOTE — ED PROVIDER NOTES
HPI       71-year-old female with a history of bipolar, cholesterol, kidney stones, presents to the ED with back pain for 3 days. She states she has been on antibiotics for urinary tract infection for a month. She does no longer has dysuria but the urine is cloudy. She denies blood in the urine. She states for the last 3 days she has been nauseated has not been able to eat she has been able to sleep the pain is a 10 out of 10 she has not had any Tylenol today while she has been taking for the pain thus far as Tylenol. The reason she is been on antibiotics for a month is because she did not take the medication as prescribed. She states she cannot remember to take it 4 times a day so she does periodically takes the tablets over the course of the month. She denies any chest pain cough. She denies a fever.   Past Medical History:   Diagnosis Date    Bipolar 1 disorder ([facility] [address]     Depression     Eczema     Elevated lipids     History of kidney stones        Past Surgical History:   Procedure Laterality Date    HX LITHOTRIPSY           Family History:   Problem Relation Age of Onset    Cancer Mother     Lung Disease Mother     Diabetes Father        Social History     Socioeconomic History    Marital status: SINGLE     Spouse name: Not on file    Number of children: Not on file    Years of education: Not on file    Highest education level: Not on file   Occupational History    Not on file   Social Needs    Financial resource strain: Not on file    Food insecurity:     Worry: Not on file     Inability: Not on file    Transportation needs:     Medical: Not on file     Non-medical: Not on file   Tobacco Use    Smoking status: Never Smoker    Smokeless tobacco: Never Used   Substance and Sexual Activity    Alcohol use: No    Drug use: No    Sexual activity: Not Currently   Lifestyle    Physical activity:     Days per week: Not on file     Minutes per session: Not on file    Stress: Not on file Relationships    Social connections:     Talks on phone: Not on file     Gets together: Not on file     Attends Confucianist service: Not on file     Active member of club or organization: Not on file     Attends meetings of clubs or organizations: Not on file     Relationship status: Not on file    Intimate partner violence:     Fear of current or ex partner: Not on file     Emotionally abused: Not on file     Physically abused: Not on file     Forced sexual activity: Not on file   Other Topics Concern    Not on file   Social History Narrative    Not on file         ALLERGIES: Avocado; Maycol seed; Geodon [ziprasidone hcl]; Haldol [haloperidol lactate]; Penicillins; and Sulfur    Review of Systems   Constitutional: Negative for fever. HENT: Negative for congestion. Eyes: Negative for visual disturbance. Respiratory: Negative for cough and shortness of breath. Cardiovascular: Negative for chest pain. Gastrointestinal: Positive for nausea. Negative for abdominal pain. Genitourinary: Positive for flank pain. Musculoskeletal: Negative for gait problem. Skin: Negative for rash. Neurological: Negative for headaches. Psychiatric/Behavioral: Negative for dysphoric mood. Vitals:    10/01/19 0332   BP: 148/87   Pulse: 63   Resp: 16   Temp: 98.4 °F (36.9 °C)   SpO2: 95%            Physical Exam   Constitutional: She is oriented to person, place, and time. She appears well-developed and well-nourished. No distress. HENT:   Head: Normocephalic and atraumatic. Mouth/Throat: No oropharyngeal exudate. Eyes: Pupils are equal, round, and reactive to light. Right eye exhibits no discharge. Left eye exhibits no discharge. No scleral icterus. Neck: Normal range of motion. Neck supple. No JVD present. Cardiovascular: Normal rate, regular rhythm and normal heart sounds. No murmur heard. Pulmonary/Chest: Effort normal and breath sounds normal. No stridor. No respiratory distress.  She has no wheezes. She has no rales. She exhibits no tenderness. Abdominal: Soft. Bowel sounds are normal. She exhibits no distension and no mass. There is no tenderness. There is no rebound and no guarding. Musculoskeletal: Normal range of motion. Neurological: She is oriented to person, place, and time. Skin: Skin is warm and dry. Capillary refill takes less than 2 seconds. No rash noted. Psychiatric: She has a normal mood and affect. Her behavior is normal. Judgment and thought content normal.        MDM       Procedures        Pain resolved with toradol. Urine c/w infection- Omnicef prescribed. CT shows mild left hydro c/w passed stone. Reviewed results with patient and questions answered. She will follow up with urology, will return for fever, uncontrolled pain/vomiting.

## 2019-10-03 LAB
BACTERIA SPEC CULT: ABNORMAL
CC UR VC: ABNORMAL
SERVICE CMNT-IMP: ABNORMAL

## 2020-02-03 ENCOUNTER — OFFICE VISIT (OUTPATIENT)
Dept: INTERNAL MEDICINE CLINIC | Age: 45
End: 2020-02-03

## 2020-02-03 VITALS
SYSTOLIC BLOOD PRESSURE: 134 MMHG | TEMPERATURE: 98.7 F | HEART RATE: 104 BPM | DIASTOLIC BLOOD PRESSURE: 97 MMHG | WEIGHT: 195.2 LBS | OXYGEN SATURATION: 99 % | RESPIRATION RATE: 16 BRPM | HEIGHT: 66 IN | BODY MASS INDEX: 31.37 KG/M2

## 2020-02-03 DIAGNOSIS — E78.00 PURE HYPERCHOLESTEROLEMIA: ICD-10-CM

## 2020-02-03 DIAGNOSIS — I10 ESSENTIAL HYPERTENSION: Primary | ICD-10-CM

## 2020-02-03 DIAGNOSIS — Z23 ENCOUNTER FOR IMMUNIZATION: ICD-10-CM

## 2020-02-03 DIAGNOSIS — D72.829 LEUKOCYTOSIS, UNSPECIFIED TYPE: ICD-10-CM

## 2020-02-03 DIAGNOSIS — Z11.1 SCREENING-PULMONARY TB: ICD-10-CM

## 2020-02-03 DIAGNOSIS — E87.6 HYPOKALEMIA: ICD-10-CM

## 2020-02-03 RX ORDER — LOSARTAN POTASSIUM 100 MG/1
TABLET ORAL
Qty: 30 TAB | Refills: 5 | Status: SHIPPED | OUTPATIENT
Start: 2020-02-03 | End: 2020-05-13

## 2020-02-03 RX ORDER — TRIAMCINOLONE ACETONIDE 0.25 MG/G
CREAM TOPICAL 2 TIMES DAILY
Qty: 15 G | Refills: 0 | Status: SHIPPED | OUTPATIENT
Start: 2020-02-03

## 2020-02-03 NOTE — PROGRESS NOTES
Rm 9    Chief Complaint   Patient presents with    Blood Pressure Check     has been running per pt, pt states she has not had her Losartan since last visit in april 2019    Immunization/Injection     PPD for employment   Pt does have a kidney stone. Denies any pain today. Pt needs to make appt with urology, last appt was missed per pt      1. Have you been to the ER, urgent care clinic since your last visit? Hospitalized since your last visit? ED 10/1/19, uti, back pain    2. Have you seen or consulted any other health care providers outside of the [address] [place] [address] since your last visit? Include any pap smears or colon screening.  No    Health Maintenance Due   Topic Date Due    DTaP/Tdap/Td series (1 - Tdap) 04/27/1987    Influenza Age 5 to Adult  08/01/2019    MEDICARE YEARLY EXAM  12/27/2019   flu vaccine refused    3 most recent PHQ Screens 2/3/2020   PHQ Not Done -   Little interest or pleasure in doing things Not at all   Feeling down, depressed, irritable, or hopeless Not at all   Total Score PHQ 2 0     Learning Assessment 5/22/2019   PRIMARY LEARNER Patient   HIGHEST LEVEL OF EDUCATION - PRIMARY LEARNER  SOME COLLEGE   BARRIERS PRIMARY LEARNER NONE   CO-LEARNER CAREGIVER No   PRIMARY LANGUAGE ENGLISH   LEARNER PREFERENCE PRIMARY READING   ANSWERED BY pt   RELATIONSHIP SELF

## 2020-02-03 NOTE — PATIENT INSTRUCTIONS
Home Blood Pressure Test: About This Test 
What is it? A home blood pressure test allows you to keep track of your blood pressure at home. Blood pressure is a measure of the force of blood against the walls of your arteries. Blood pressure readings include two numbers, such as 130/80 (say \"130 over 80\"). The first number is the systolic pressure. The second number is the diastolic pressure. Why is this test done? You may do this test at home to: · Find out if you have high blood pressure. · Track your blood pressure if you have high blood pressure. · Track how well medicine is working to reduce high blood pressure. · Check how lifestyle changes, such as weight loss and exercise, are affecting blood pressure. How can you prepare for the test? 
· Do not use caffeine, tobacco, or medicines known to raise blood pressure (such as nasal decongestant sprays) for at least 30 minutes before taking your blood pressure. · Do not exercise for at least 30 minutes before taking your blood pressure. What happens before the test? 
Take your blood pressure while you feel comfortable and relaxed. Sit quietly with both feet on the floor for at least 5 minutes before the test. 
What happens during the test? 
· Sit with your arm slightly bent and resting on a table so that your upper arm is at the same level as your heart. · Roll up your sleeve or take off your shirt to expose your upper arm. · Wrap the blood pressure cuff around your upper arm so that the lower edge of the cuff is about 1 inch above the bend of your elbow. Proceed with the following steps depending on if you are using an automatic or manual pressure monitor. Automatic blood pressure monitors · Press the on/off button on the automatic monitor and wait until the ready-to-measure \"heart\" symbol appears next to zero in the display window. · Press the start button. The cuff will inflate and deflate by itself. · Your blood pressure numbers will appear on the screen. · Write your numbers in your log book, along with the date and time. Manual blood pressure monitors · Place the earpieces of a stethoscope in your ears, and place the bell of the stethoscope over the artery, just below the cuff. · Close the valve on the rubber inflating bulb. · Squeeze the bulb rapidly with your opposite hand to inflate the cuff until the dial or column of mercury reads about 30 mm Hg higher than your usual systolic pressure. If you do not know your usual pressure, inflate the cuff to 210 mm Hg or until the pulse at your wrist disappears. · Open the pressure valve just slightly by twisting or pressing the valve on the bulb. · As you watch the pressure slowly fall, note the level on the dial at which you first start to hear a pulsing or tapping sound through the stethoscope. This is your systolic blood pressure. · Continue letting the air out slowly. The sounds will become muffled and will finally disappear. Note the pressure when the sounds completely disappear. This is your diastolic blood pressure. Let out all the remaining air. · Write your numbers in your log book, along with the date and time. What else should you know about the test? 
It is more accurate to take the average of several readings made throughout the day than to rely on a single reading. It's normal for blood pressure to go up and down throughout the day. Follow-up care is a key part of your treatment and safety. Be sure to make and go to all appointments, and call your doctor if you are having problems. It's also a good idea to keep a list of the medicines you take. Where can you learn more? Go to http://shreya-ann.info/. Enter C427 in the search box to learn more about \"Home Blood Pressure Test: About This Test.\" Current as of: April 9, 2019 Content Version: 12.2 © 7486-2213 [employer].  Care instructions adapted under license by [address] (which disclaims liability or warranty for this information). If you have questions about a medical condition or this instruction, always ask your healthcare professional. [address] any warranty or liability for your use of this information. Learning About High Blood Pressure What is high blood pressure? Blood pressure is a measure of how hard the blood pushes against the walls of your arteries. It's normal for blood pressure to go up and down throughout the day, but if it stays up, you have high blood pressure. Another name for high blood pressure is hypertension. Two numbers tell you your blood pressure. The first number is the systolic pressure. It shows how hard the blood pushes when your heart is pumping. The second number is the diastolic pressure. It shows how hard the blood pushes between heartbeats, when your heart is relaxed and filling with blood. Your doctor will give you a goal for your blood pressure. Your goal will be based on your health and your age. High blood pressure (hypertension) means that the top number stays high, or the bottom number stays high, or both. High blood pressure increases the risk of stroke, heart attack, and other problems. You and your doctor will talk about your risks of these problems based on your blood pressure. What happens when you have high blood pressure? · Blood flows through your arteries with too much force. Over time, this damages the walls of your arteries. But you can't feel it. High blood pressure usually doesn't cause symptoms. · Fat and calcium start to build up in your arteries. This buildup is called plaque. Plaque makes your arteries narrower and stiffer. Blood can't flow through them as easily. · This lack of good blood flow starts to damage some of the organs in your body.  This can lead to problems such as coronary artery disease and heart attack, heart failure, stroke, kidney failure, and eye damage. How can you prevent high blood pressure? · Stay at a healthy weight. · Try to limit how much sodium you eat to less than 2,300 milligrams (mg) a day. If you limit your sodium to 1,500 mg a day, you can lower your blood pressure even more. ? Buy foods that are labeled \"unsalted,\" \"sodium-free,\" or \"low-sodium. \" Foods labeled \"reduced-sodium\" and \"light sodium\" may still have too much sodium. ? Flavor your food with garlic, lemon juice, onion, vinegar, herbs, and spices instead of salt. Do not use soy sauce, steak sauce, onion salt, garlic salt, mustard, or ketchup on your food. ? Use less salt (or none) when recipes call for it. You can often use half the salt a recipe calls for without losing flavor. · Be physically active. Get at least 30 minutes of exercise on most days of the week. Walking is a good choice. You also may want to do other activities, such as running, swimming, cycling, or playing tennis or team sports. · Limit alcohol to 2 drinks a day for men and 1 drink a day for women. · Eat plenty of fruits, vegetables, and low-fat dairy products. Eat less saturated and total fats. How is high blood pressure treated? · Your doctor will suggest making lifestyle changes to help your heart. For example, your doctor may ask you to eat healthy foods, quit smoking, lose extra weight, and be more active. · If lifestyle changes don't help enough, your doctor may recommend that you take medicine. · When blood pressure is very high, medicines are needed to lower it. Follow-up care is a key part of your treatment and safety. Be sure to make and go to all appointments, and call your doctor if you are having problems. It's also a good idea to know your test results and keep a list of the medicines you take. Where can you learn more? Go to http://shreya-ann.info/. Enter P501 in the search box to learn more about \"Learning About High Blood Pressure. \" Current as of: April 9, 2019 Content Version: 12.2 © 8584-2426 [employer]. Care instructions adapted under license by [email or web address] (which disclaims liability or warranty for this information). If you have questions about a medical condition or this instruction, always ask your healthcare professional. [address] any warranty or liability for your use of this information. Home Blood Pressure Test: About This Test 
What is it? A home blood pressure test allows you to keep track of your blood pressure at home. Blood pressure is a measure of the force of blood against the walls of your arteries. Blood pressure readings include two numbers, such as 130/80 (say \"130 over 80\"). The first number is the systolic pressure. The second number is the diastolic pressure. Why is this test done? You may do this test at home to: · Find out if you have high blood pressure. · Track your blood pressure if you have high blood pressure. · Track how well medicine is working to reduce high blood pressure. · Check how lifestyle changes, such as weight loss and exercise, are affecting blood pressure. How can you prepare for the test? 
· Do not use caffeine, tobacco, or medicines known to raise blood pressure (such as nasal decongestant sprays) for at least 30 minutes before taking your blood pressure. · Do not exercise for at least 30 minutes before taking your blood pressure. What happens before the test? 
Take your blood pressure while you feel comfortable and relaxed. Sit quietly with both feet on the floor for at least 5 minutes before the test. 
What happens during the test? 
· Sit with your arm slightly bent and resting on a table so that your upper arm is at the same level as your heart. · Roll up your sleeve or take off your shirt to expose your upper arm. · Wrap the blood pressure cuff around your upper arm so that the lower edge of the cuff is about 1 inch above the bend of your elbow. Proceed with the following steps depending on if you are using an automatic or manual pressure monitor. Automatic blood pressure monitors · Press the on/off button on the automatic monitor and wait until the ready-to-measure \"heart\" symbol appears next to zero in the display window. · Press the start button. The cuff will inflate and deflate by itself. · Your blood pressure numbers will appear on the screen. · Write your numbers in your log book, along with the date and time. Manual blood pressure monitors · Place the earpieces of a stethoscope in your ears, and place the bell of the stethoscope over the artery, just below the cuff. · Close the valve on the rubber inflating bulb. · Squeeze the bulb rapidly with your opposite hand to inflate the cuff until the dial or column of mercury reads about 30 mm Hg higher than your usual systolic pressure. If you do not know your usual pressure, inflate the cuff to 210 mm Hg or until the pulse at your wrist disappears. · Open the pressure valve just slightly by twisting or pressing the valve on the bulb. · As you watch the pressure slowly fall, note the level on the dial at which you first start to hear a pulsing or tapping sound through the stethoscope. This is your systolic blood pressure. · Continue letting the air out slowly. The sounds will become muffled and will finally disappear. Note the pressure when the sounds completely disappear. This is your diastolic blood pressure. Let out all the remaining air. · Write your numbers in your log book, along with the date and time. What else should you know about the test? 
It is more accurate to take the average of several readings made throughout the day than to rely on a single reading. It's normal for blood pressure to go up and down throughout the day. Follow-up care is a key part of your treatment and safety. Be sure to make and go to all appointments, and call your doctor if you are having problems. It's also a good idea to keep a list of the medicines you take. Where can you learn more? Go to http://shreya-ann.info/. Enter C427 in the search box to learn more about \"Home Blood Pressure Test: About This Test.\" Current as of: April 9, 2019 Content Version: 12.2 © 6447-9450 [employer]. Care instructions adapted under license by [employer] (which disclaims liability or warranty for this information). If you have questions about a medical condition or this instruction, always ask your healthcare professional. [address] any warranty or liability for your use of this information.

## 2020-02-03 NOTE — PROGRESS NOTES
HISTORY OF PRESENT ILLNESS  Danna Gallardo is a 38 y.o. female. HPI   She has not taken blood pressure medications  for several months. Too busy for return visit and did not know she could request refill  Kidney stones. Missed urology appointment with [facility] provider, will reschedule      mammogram past due     Pap smear last year     She needs TB screening for job      Past Medical History:   Diagnosis Date    Bipolar 1 disorder ([address]     Depression     Eczema     Elevated lipids     History of kidney stones        Current Outpatient Medications   Medication Sig    triamcinolone acetonide (KENALOG) 0.025 % topical cream Apply  to affected area two (2) times a day. use thin layer    losartan (COZAAR) 100 mg tablet take 1 tablet by mouth once daily  Indications: high blood pressure    fluPHENAZine (PROLIXIN) 10 mg tablet take 1 tablet by mouth twice a day for 21 DAYS    carBAMazepine XR (TEGRETOL XR) 200 mg SR tablet     ibuprofen (MOTRIN) 800 mg tablet Take 1 tablet by mouth every eight (8) hours as needed for Pain. No current facility-administered medications for this visit. Allergies   Allergen Reactions    Avocado Hives    Maycol Seed Hives    Geodon [Ziprasidone Hcl] Other (comments)     Pt states it makes her want to commit sucide.  Haldol [Haloperidol Lactate] Other (comments)     Extreme body aches    Penicillins Other (comments)     Allergy testing    Sulfur Other (comments)     Allergy testing     Review of Systems   Constitutional: Negative. Negative for chills, fever and malaise/fatigue. HENT: Negative. Eyes: Negative. Respiratory: Negative. Cardiovascular: Negative for chest pain, palpitations and claudication. Gastrointestinal: Negative. Genitourinary: Negative. Musculoskeletal: Negative. Skin: Negative. Neurological: Negative for dizziness and headaches. Psychiatric/Behavioral: Negative.       BP (!) 134/97 (BP 1 Location: Left arm, BP Patient Position: Sitting)   Pulse (!) 104   Temp 98.7 °F (37.1 °C) (Oral)   Resp 16   Ht 5' 5.98\" (1.676 m)   Wt 195 lb 3.2 oz (88.5 kg)   LMP 12/16/2019   SpO2 99%   BMI 31.53 kg/m²   Physical Exam  Constitutional:       Appearance: She is obese. Neck:      Musculoskeletal: Normal range of motion and neck supple. Cardiovascular:      Rate and Rhythm: Normal rate and regular rhythm. Pulmonary:      Effort: Pulmonary effort is normal.      Breath sounds: Normal breath sounds. Musculoskeletal:      Right lower leg: No edema. Left lower leg: No edema. Lymphadenopathy:      Cervical: No cervical adenopathy. Skin:     General: Skin is warm and dry. Neurological:      Mental Status: She is alert and oriented to person, place, and time. Mental status is at baseline. Psychiatric:         Mood and Affect: Mood normal.         Behavior: Behavior normal.         Thought Content: Thought content normal.         Judgment: Judgment normal.         ASSESSMENT and PLAN    ICD-10-CM ICD-9-CM    1. Essential hypertension I10 401.9 losartan (COZAAR) 100 mg tablet   2. Encounter for immunization Z23 V03.89    3. Pure hypercholesterolemia E78.00 272.0    4. Hypokalemia M97.4 654.9 METABOLIC PANEL, COMPREHENSIVE   5. Leukocytosis, unspecified type D72.829 288.60 CBC WITH AUTOMATED DIFF   6. Screening-pulmonary TB Z11.1 V74.1 QUANTIFERON-TB GOLD PLUS     Follow-up and Dispositions    · Return in about 3 months (around 5/3/2020) for htn. Restart Losartan, reinforced compliance with medical management. Recommended blood pressure monitoring   lab results and schedule of future lab studies reviewed with patient  reviewed diet, exercise and weight control  reviewed medications and side effects in detail    Patient voices understanding and acceptance of this advice and will call back if any further questions or concerns. An After Visit Summary was printed and given to the patient.

## 2020-02-06 LAB
ALBUMIN SERPL-MCNC: 4.1 G/DL (ref 3.8–4.8)
ALBUMIN/GLOB SERPL: 1.2 {RATIO} (ref 1.2–2.2)
ALP SERPL-CCNC: 235 IU/L (ref 39–117)
ALT SERPL-CCNC: 45 IU/L (ref 0–32)
AST SERPL-CCNC: 46 IU/L (ref 0–40)
BASOPHILS # BLD AUTO: 0.1 X10E3/UL (ref 0–0.2)
BASOPHILS NFR BLD AUTO: 1 %
BILIRUB SERPL-MCNC: 0.3 MG/DL (ref 0–1.2)
BUN SERPL-MCNC: 7 MG/DL (ref 6–24)
BUN/CREAT SERPL: 7 (ref 9–23)
CALCIUM SERPL-MCNC: 9.4 MG/DL (ref 8.7–10.2)
CHLORIDE SERPL-SCNC: 103 MMOL/L (ref 96–106)
CO2 SERPL-SCNC: 25 MMOL/L (ref 20–29)
CREAT SERPL-MCNC: 0.98 MG/DL (ref 0.57–1)
EOSINOPHIL # BLD AUTO: 0.2 X10E3/UL (ref 0–0.4)
EOSINOPHIL NFR BLD AUTO: 2 %
ERYTHROCYTE [DISTWIDTH] IN BLOOD BY AUTOMATED COUNT: 12.5 % (ref 11.7–15.4)
GAMMA INTERFERON BACKGROUND BLD IA-ACNC: 0.02 IU/ML
GLOBULIN SER CALC-MCNC: 3.4 G/DL (ref 1.5–4.5)
GLUCOSE SERPL-MCNC: 81 MG/DL (ref 65–99)
HCT VFR BLD AUTO: 40.8 % (ref 34–46.6)
HGB BLD-MCNC: 13.8 G/DL (ref 11.1–15.9)
IMM GRANULOCYTES # BLD AUTO: 0 X10E3/UL (ref 0–0.1)
IMM GRANULOCYTES NFR BLD AUTO: 0 %
LYMPHOCYTES # BLD AUTO: 2.7 X10E3/UL (ref 0.7–3.1)
LYMPHOCYTES NFR BLD AUTO: 26 %
M TB IFN-G BLD-IMP: NEGATIVE
M TB IFN-G CD4+ BCKGRND COR BLD-ACNC: 0.02 IU/ML
MCH RBC QN AUTO: 29.4 PG (ref 26.6–33)
MCHC RBC AUTO-ENTMCNC: 33.8 G/DL (ref 31.5–35.7)
MCV RBC AUTO: 87 FL (ref 79–97)
MITOGEN IGNF BLD-ACNC: >10 IU/ML
MONOCYTES # BLD AUTO: 0.5 X10E3/UL (ref 0.1–0.9)
MONOCYTES NFR BLD AUTO: 5 %
NEUTROPHILS # BLD AUTO: 6.9 X10E3/UL (ref 1.4–7)
NEUTROPHILS NFR BLD AUTO: 66 %
PLATELET # BLD AUTO: 443 X10E3/UL (ref 150–450)
POTASSIUM SERPL-SCNC: 4.6 MMOL/L (ref 3.5–5.2)
PROT SERPL-MCNC: 7.5 G/DL (ref 6–8.5)
QUANTIFERON INCUBATION, QF1T: NORMAL
QUANTIFERON TB2 AG: 0.01 IU/ML
RBC # BLD AUTO: 4.69 X10E6/UL (ref 3.77–5.28)
SERVICE CMNT-IMP: NORMAL
SODIUM SERPL-SCNC: 141 MMOL/L (ref 134–144)
WBC # BLD AUTO: 10.3 X10E3/UL (ref 3.4–10.8)

## 2020-02-07 NOTE — PROGRESS NOTES
Please advise patient liver enzymes have improved. Ask if she has schedule appointment with urologist . Follow up for repeat  labs in 1-2 months.  TB screening is negative

## 2020-02-12 NOTE — PROGRESS NOTES
Called pt. Verified name and . Informed of providers message and lab results. She states she hasnt made the appt. With urology yet but will call next week and schedule w/ U/ swapna yadav. Offered to schedule lab only appt. Today, pt will call back.

## 2020-04-06 ENCOUNTER — VIRTUAL VISIT (OUTPATIENT)
Dept: INTERNAL MEDICINE CLINIC | Age: 45
End: 2020-04-06

## 2020-04-06 DIAGNOSIS — F99 PSYCHIATRIC DISORDER: ICD-10-CM

## 2020-04-06 DIAGNOSIS — E78.00 PURE HYPERCHOLESTEROLEMIA: ICD-10-CM

## 2020-04-06 DIAGNOSIS — R74.8 ELEVATED LIVER ENZYMES: ICD-10-CM

## 2020-04-06 DIAGNOSIS — I10 ESSENTIAL HYPERTENSION: Primary | ICD-10-CM

## 2020-04-06 NOTE — PROGRESS NOTES
Telephone visit    Chief Complaint   Patient presents with    Hypertension    Labs     liver enzymes   Pt is not monitoring BP  Urology appt today, They are going to have pt get CT scan and urine sample 4/20    1. Have you been to the ER, urgent care clinic since your last visit? Hospitalized since your last visit? No    2. Have you seen or consulted any other health care providers outside of the [address] [place] [address] since your last visit? Include any pap smears or colon screening.  No        Health Maintenance Due   Topic Date Due    DTaP/Tdap/Td series (1 - Tdap) 04/27/1997    Medicare Yearly Exam  12/27/2019           3 most recent PHQ Screens 2/3/2020   PHQ Not Done -   Little interest or pleasure in doing things Not at all   Feeling down, depressed, irritable, or hopeless Not at all   Total Score PHQ 2 0           Learning Assessment 5/22/2019   PRIMARY LEARNER Patient   HIGHEST LEVEL OF EDUCATION - PRIMARY LEARNER  SOME COLLEGE   BARRIERS PRIMARY LEARNER NONE   CO-LEARNER CAREGIVER No   PRIMARY LANGUAGE ENGLISH   LEARNER PREFERENCE PRIMARY READING   ANSWERED BY pt   RELATIONSHIP SELF

## 2020-04-06 NOTE — PROGRESS NOTES
Subjective  Earlene Johnson is a 38 y.o. female presents for hypertension follow up     This is a virtual video visit via Doxy. me as agreed to by patient, visit type was converted to telephone encounter due to technical problems with her telephone     Earlene Johnson is a 38 y.o. female evaluated via telephone on 4/6/2020. Consent:  She and/or health care decision maker is aware that that she may receive a bill for this telephone service, depending on her insurance coverage, and has provided verbal consent to proceed: Yes      Documentation:  I communicated with the patient and/or health care decision maker about chronic medical conditions . Details of this discussion including any medical advice provided: as detailed below       I affirm this is a Patient Initiated Episode with an Established Patient who has not had a related appointment within my department in the past 7 days or scheduled within the next 24 hours. Total Time: minutes: 5-10 minutes    Note: not billable if this call serves to triage the patient into an appointment for the relevant concern      Sarita Carias NP   HPI   No interim change in medical management   She is compliant with medical management t. No blood pressure monitoring. No longer has headaches, feels fine. Believes this signals blood pressure is better  Plans to get blood pressure monitor this week    Kidney stone. Virtual Urology visit today. Scheduled for CT scan on 4/20    Past Medical History:   Diagnosis Date    Bipolar 1 disorder (H[facility])     Depression     Eczema     Elevated lipids     History of kidney stones        Current Outpatient Medications   Medication Sig    triamcinolone acetonide (KENALOG) 0.025 % topical cream Apply  to affected area two (2) times a day.  use thin layer    losartan (COZAAR) 100 mg tablet take 1 tablet by mouth once daily  Indications: high blood pressure    fluPHENAZine (PROLIXIN) 10 mg tablet take 1 tablet by mouth twice a day for 21 DAYS    carBAMazepine XR (TEGRETOL XR) 200 mg SR tablet     ibuprofen (MOTRIN) 800 mg tablet Take 1 tablet by mouth every eight (8) hours as needed for Pain. No current facility-administered medications for this visit. Allergies   Allergen Reactions    Avocado Hives    Maycol Seed Hives    Geodon [Ziprasidone Hcl] Other (comments)     Pt states it makes her want to commit sucide.  Haldol [Haloperidol Lactate] Other (comments)     Extreme body aches    Penicillins Other (comments)     Allergy testing    Sulfur Other (comments)     Allergy testing       Past Surgical History:   Procedure Laterality Date    HX LITHOTRIPSY       Review of Systems   Constitutional: Negative. Respiratory: Negative. Cardiovascular: Negative. Gastrointestinal: Negative. Genitourinary: Negative. Neurological: Negative for dizziness and headaches. Objective  Physical Exam     Assessment & Plan    ICD-10-CM ICD-9-CM    1. Essential hypertension D86 267.7 METABOLIC PANEL, COMPREHENSIVE   2. Pure hypercholesterolemia T73.72 762.2 METABOLIC PANEL, COMPREHENSIVE   3. Elevated liver enzymes F26.3 065.6 METABOLIC PANEL, COMPREHENSIVE        instructed to monitor blood pressure with arm cuff  Continue to avoid Acetaminophen   current treatment plan is effective, no change in therapy  lab results and schedule of future lab studies reviewed with patient  reviewed diet, exercise and weight control  reviewed medications and side effects in detail     Patient voices understanding and acceptance of this advice and will call back if any further questions or concerns.   Briseyda Etienne NP

## 2020-04-17 ENCOUNTER — VIRTUAL VISIT (OUTPATIENT)
Dept: INTERNAL MEDICINE CLINIC | Age: 45
End: 2020-04-17

## 2020-04-17 DIAGNOSIS — R21 RASH AND NONSPECIFIC SKIN ERUPTION: ICD-10-CM

## 2020-04-17 DIAGNOSIS — I10 ESSENTIAL HYPERTENSION: Primary | ICD-10-CM

## 2020-04-17 RX ORDER — AMLODIPINE BESYLATE 5 MG/1
5 TABLET ORAL DAILY
Qty: 30 TAB | Refills: 1 | Status: SHIPPED | OUTPATIENT
Start: 2020-04-17 | End: 2020-05-13 | Stop reason: SDUPTHER

## 2020-04-17 NOTE — PROGRESS NOTES
Subjective  Kevon Tam is a 38 y.o. female presents for acute care     Kevon Tam is a 38 y.o. female evaluated via telephone on 4/17/2020. Consent:  She and/or health care decision maker is aware that she may receive a bill for this telephone service, depending on her insurance coverage, and has provided verbal consent to proceed: Yes      Documentation:  I communicated with the patient and/or health care decision maker about hypertension, allergic reaction . Details of this discussion including any medical advice provided: documented below       I affirm this is a Patient Initiated Episode with an Established Patient who has not had a related appointment within my department in the past 7 days or scheduled within the next 24 hours. Total Time: minutes: 5-10 minutes    Note: not billable if this call serves to triage the patient into an appointment for the relevant concern      Heather Cadet NP   HPI   She stopped blood pressure medication yesterday  She has itchy \"mosquito bite\" lesions on arms, legs, back and chest. Lesion starts shortly after she restated Losartan in February, worse after she takes medication at nights. She denies recent illness, travel, dietary changes, new OTC medication   Did not have similar reaction when she took medication before     Past Medical History:   Diagnosis Date    Bipolar 1 disorder ([facility] [address]     Depression     Eczema     Elevated lipids     History of kidney stones      Current Outpatient Medications   Medication Sig    amLODIPine (NORVASC) 5 mg tablet Take 1 Tab by mouth daily.  triamcinolone acetonide (KENALOG) 0.025 % topical cream Apply  to affected area two (2) times a day.  use thin layer    fluPHENAZine (PROLIXIN) 10 mg tablet take 1 tablet by mouth twice a day for 21 DAYS    carBAMazepine XR (TEGRETOL XR) 200 mg SR tablet     losartan (COZAAR) 100 mg tablet take 1 tablet by mouth once daily  Indications: high blood pressure    ibuprofen (MOTRIN) 800 mg tablet Take 1 tablet by mouth every eight (8) hours as needed for Pain. No current facility-administered medications for this visit. Review of Systems   Respiratory: Negative. Cardiovascular: Negative. Gastrointestinal: Negative. Genitourinary: Negative. Skin: Positive for itching and rash. Neurological: Negative. Objective  Physical Exam     Assessment & Plan    ICD-10-CM ICD-9-CM    1. Essential hypertension I10 401.9    2. Rash and nonspecific skin eruption R21 782.1      Follow-up and Dispositions    · Return in about 2 weeks (around 5/1/2020) for htn.     discussed likely drug reaction vs allergic reaction vs insect bite  Discussed alternate blood pressure medication and possible SEs  Call if rash does not resolve off medication   Encouraged to monitor blood pressure   lab results and schedule of future lab studies reviewed with patient  reviewed diet, exercise and weight control  reviewed medications and side effects in detail      Patient voices understanding and acceptance of this advice and will call back if any further questions or concerns.   Lian Joshi NP

## 2020-04-17 NOTE — PROGRESS NOTES
Chief Complaint   Patient presents with    Allergic Reaction     Losartan, skin is really itchy. states she noticed about 2 weeks of taking the medication. Rash on back and arms. 1. Have you been to the ER, urgent care clinic since your last visit? Hospitalized since your last visit? No    2. Have you seen or consulted any other health care providers outside of the [address] [place] [address] since your last visit? Include any pap smears or colon screening.  No        Health Maintenance Due   Topic Date Due    DTaP/Tdap/Td series (1 - Tdap) 04/27/1997    Medicare Yearly Exam  12/27/2019           3 most recent PHQ Screens 2/3/2020   PHQ Not Done -   Little interest or pleasure in doing things Not at all   Feeling down, depressed, irritable, or hopeless Not at all   Total Score PHQ 2 0           Learning Assessment 5/22/2019   PRIMARY LEARNER Patient   HIGHEST LEVEL OF EDUCATION - PRIMARY LEARNER  SOME COLLEGE   BARRIERS PRIMARY LEARNER NONE   CO-LEARNER CAREGIVER No   PRIMARY LANGUAGE ENGLISH   LEARNER PREFERENCE PRIMARY READING   ANSWERED BY pt   RELATIONSHIP SELF

## 2020-05-04 ENCOUNTER — VIRTUAL VISIT (OUTPATIENT)
Dept: INTERNAL MEDICINE CLINIC | Age: 45
End: 2020-05-04

## 2020-05-13 ENCOUNTER — VIRTUAL VISIT (OUTPATIENT)
Dept: INTERNAL MEDICINE CLINIC | Age: 45
End: 2020-05-13

## 2020-05-13 VITALS — WEIGHT: 178 LBS | SYSTOLIC BLOOD PRESSURE: 140 MMHG | DIASTOLIC BLOOD PRESSURE: 85 MMHG | BODY MASS INDEX: 28.75 KG/M2

## 2020-05-13 DIAGNOSIS — I10 ESSENTIAL HYPERTENSION: Primary | ICD-10-CM

## 2020-05-13 RX ORDER — GLUCOSAMINE/CHONDR SU A SOD 750-600 MG
TABLET ORAL DAILY
COMMUNITY

## 2020-05-13 NOTE — PROGRESS NOTES
[phone]-- telephone call only     BP last week : 117/86    Chief Complaint   Patient presents with    Hypertension     f/u      1. Have you been to the ER, urgent care clinic since your last visit? Hospitalized since your last visit? No    2. Have you seen or consulted any other health care providers outside of the [address] [place] [address] since your last visit? Include any pap smears or colon screening.  Yes ronda alston [facility] urologist, [phone]  Health Maintenance Due   Topic Date Due    DTaP/Tdap/Td series (1 - Tdap) 04/27/1997    Medicare Yearly Exam  12/27/2019     3 most recent PHQ Screens 2/3/2020   PHQ Not Done -   Little interest or pleasure in doing things Not at all   Feeling down, depressed, irritable, or hopeless Not at all   Total Score PHQ 2 0

## 2020-05-15 RX ORDER — AMLODIPINE BESYLATE 5 MG/1
5 TABLET ORAL DAILY
Qty: 30 TAB | Refills: 5 | Status: SHIPPED | OUTPATIENT
Start: 2020-05-15 | End: 2020-06-08 | Stop reason: SDUPTHER

## 2020-05-15 NOTE — PROGRESS NOTES
Subjective  Ila Perez is a 41 y.o. female presents for hypertension follow up   Consent: Ila Perez, who was seen by synchronous (real-time) audio only technology, and/or her healthcare decision maker, is aware that this patient-initiated, Telehealth encounter on 5/13/2020 is a billable service, with coverage as determined by her insurance carrier. She is aware that she may receive a bill and has provided verbal consent to proceed: Yes. Ila Perez is a 41 y.o. female evaluated via audio only technology on 5/13/2020. Consent: She and/or her health care decision maker is aware that she may receive a bill for this audio only encounter, depending on her insurance coverage, and has provided verbal consent to proceed: Yes    I communicated with the patient and/or health care decision maker about the nature and details of the following:  Assessment & Plan:   Diagnoses and all orders for this visit:    1. Essential hypertension    Other orders  -     amLODIPine (NORVASC) 5 mg tablet; Take 1 Tab by mouth daily. 12  Subjective:   Ila Perez is a 41 y.o. female who was seen for Hypertension (f/u )      Prior to Admission medications    Medication Sig Start Date End Date Taking? Authorizing Provider   amLODIPine (NORVASC) 5 mg tablet Take 1 Tab by mouth daily. 5/15/20  Yes Jesus Galaviz NP   Biotin 2,500 mcg cap Take  by mouth daily. Yes Provider, Historical   triamcinolone acetonide (KENALOG) 0.025 % topical cream Apply  to affected area two (2) times a day. use thin layer 2/3/20  Yes Jesus Galaviz NP   fluPHENAZine (PROLIXIN) 10 mg tablet take 1 tablet by mouth twice a day for 21 DAYS 4/15/19  Yes Provider, Historical   carBAMazepine XR (TEGRETOL XR) 200 mg SR tablet  12/20/18  Yes Provider, Historical   ibuprofen (MOTRIN) 800 mg tablet Take 1 tablet by mouth every eight (8) hours as needed for Pain.  1/2/15  Yes Leeann Oliveira MD     Allergies   Allergen Reactions    Avocado Hives    Maycol Seed Hives    Geodon [Ziprasidone Hcl] Other (comments)     Pt states it makes her want to commit sucide.  Haldol [Haloperidol Lactate] Other (comments)     Extreme body aches    Penicillins Other (comments)     Allergy testing    Sulfur Other (comments)     Allergy testing       Patient Active Problem List   Diagnosis Code    Cough R05    Headache(784.0) R51    Indigestion K30    Right hip pain D58.040    Folliculitis S16.4    H/O: eczema Z87.2    Psychiatric disorder F99    Menometrorrhagia N92.1    Oligomenorrhea N91.5    Obesity E66.9    Eczema L30.9    Acne L70.9    Elevated blood pressure [id number]       ROS    I affirm this is a Patient-Initiated Episode with a Patient who has not had a related appointment within my department in the past 7 days or scheduled within the next 24 hours. Total Time: minutes: 5-10 minutes    Note: not billable if this call serves to triage the patient into an appointment for the relevant concern      Clovis Gregorio NP     HPI     She is compliant with medical management   Denies interim change in medical management   Blood pressure 117/87 last week 140/85 today. Unsure why BP elevated today, usually better controlled. Denies lifestyle changes   She walks every day for exercise   Follow a low sodium      Past Medical History:   Diagnosis Date    Bipolar 1 disorder (HCC)     Depression     Eczema     Elevated lipids     History of kidney stones      Current Outpatient Medications   Medication Sig    amLODIPine (NORVASC) 5 mg tablet Take 1 Tab by mouth daily.  Biotin 2,500 mcg cap Take  by mouth daily.  triamcinolone acetonide (KENALOG) 0.025 % topical cream Apply  to affected area two (2) times a day.  use thin layer    fluPHENAZine (PROLIXIN) 10 mg tablet take 1 tablet by mouth twice a day for 21 DAYS    carBAMazepine XR (TEGRETOL XR) 200 mg SR tablet     ibuprofen (MOTRIN) 800 mg tablet Take 1 tablet by mouth every eight (8) hours as needed for Pain. No current facility-administered medications for this visit. Allergies   Allergen Reactions    Avocado Hives    Maycol Seed Hives    Geodon [Ziprasidone Hcl] Other (comments)     Pt states it makes her want to commit sucide.  Haldol [Haloperidol Lactate] Other (comments)     Extreme body aches    Penicillins Other (comments)     Allergy testing    Sulfur Other (comments)     Allergy testing       Objective  Visit Vitals  /85   Wt 178 lb (80.7 kg)   LMP 05/08/2020 (Exact Date)   BMI 28.75 kg/m²     Physical Exam     Assessment & Plan    ICD-10-CM ICD-9-CM    1. Essential hypertension I10 401.9        elevated today, defer medication management. Encouraged short interval in office visit, continue monitoring  reviewed diet, exercise and weight control  reviewed medications and side effects in detail    Patient voices understanding and acceptance of this advice and will call back if any further questions or concerns.   Javid Rodriguez NP

## 2020-06-08 RX ORDER — AMLODIPINE BESYLATE 5 MG/1
5 TABLET ORAL DAILY
Qty: 90 TAB | Refills: 0 | Status: SHIPPED | OUTPATIENT
Start: 2020-06-08 | End: 2020-09-02

## 2020-06-08 NOTE — TELEPHONE ENCOUNTER
Patient would like to have a new prescription for Norvasc 5 mg sent to [facility]. Patient is changing pharmacies. Previous prescription was sent to [facility]. Please review and sign if appropriate. Thank you. Last visit 05/13/2020 Virtual visit NP Arlie Hashimoto   Next appointment None   Previous refill encounter(s) 05/15/2020 Norvasc #30 with 5 refills. Requested Prescriptions     Pending Prescriptions Disp Refills    amLODIPine (NORVASC) 5 mg tablet 90 Tab 0     Sig: Take 1 Tab by mouth daily.

## 2020-09-02 RX ORDER — AMLODIPINE BESYLATE 5 MG/1
TABLET ORAL
Qty: 90 TAB | Refills: 0 | Status: SHIPPED | OUTPATIENT
Start: 2020-09-02 | End: 2021-04-21 | Stop reason: DRUGHIGH

## 2020-10-12 NOTE — PATIENT INSTRUCTIONS
Advance Directives: Care Instructions  Your Care Instructions  An advance directive is a legal way to state your wishes at the end of your life. It tells your family and your doctor what to do if you can no longer say what you want. There are two main types of advance directives. You can change them any time that your wishes change. · A living will tells your family and your doctor your wishes about life support and other treatment. · A durable power of  for health care lets you name a person to make treatment decisions for you when you can't speak for yourself. This person is called a health care agent. If you do not have an advance directive, decisions about your medical care may be made by a doctor or a  who doesn't know you. It may help to think of an advance directive as a gift to the people who care for you. If you have one, they won't have to make tough decisions by themselves. Follow-up care is a key part of your treatment and safety. Be sure to make and go to all appointments, and call your doctor if you are having problems. It's also a good idea to know your test results and keep a list of the medicines you take. How can you care for yourself at home? · Discuss your wishes with your loved ones and your doctor. This way, there are no surprises. · Many states have a unique form. Or you might use a universal form that has been approved by many states. This kind of form can sometimes be completed and stored online. Your electronic copy will then be available wherever you have a connection to the Internet. In most cases, doctors will respect your wishes even if you have a form from a different state. · You don't need a  to do an advance directive. But you may want to get legal advice. · Think about these questions when you prepare an advance directive:  ? Who do you want to make decisions about your medical care if you are not able to?  Many people choose a family member or close friend. ? Do you know enough about life support methods that might be used? If not, talk to your doctor so you understand. ? What are you most afraid of that might happen? You might be afraid of having pain, losing your independence, or being kept alive by machines. ? Where would you prefer to die? Choices include your home, a hospital, or a nursing home. ? Would you like to have information about hospice care to support you and your family? ? Do you want to donate organs when you die? ? Do you want certain Latter day practices performed before you die? If so, put your wishes in the advance directive. · Read your advance directive every year, and make changes as needed. When should you call for help? Be sure to contact your doctor if you have any questions. Where can you learn more? Go to http://shreya-ann.info/. Enter R264 in the search box to learn more about \"Advance Directives: Care Instructions. \"  Current as of: April 19, 2018  Content Version: 11.8  © 7702-6847 [employer]. Care instructions adapted under license by [email or web address] (which disclaims liability or warranty for this information). If you have questions about a medical condition or this instruction, always ask your healthcare professional. [place] [id number] any warranty or liability for your use of this information. High Blood Pressure: Care Instructions  Your Care Instructions    If your blood pressure is usually above 130/80, you have high blood pressure, or hypertension. That means the top number is 130 or higher or the bottom number is 80 or higher, or both. Despite what a lot of people think, high blood pressure usually doesn't cause headaches or make you feel dizzy or lightheaded. It usually has no symptoms. But it does increase your risk for heart attack, stroke, and kidney or eye damage. The higher your blood pressure, the more your risk increases.   Your doctor will give you a goal for your blood pressure. Your goal will be based on your health and your age. Lifestyle changes, such as eating healthy and being active, are always important to help lower blood pressure. You might also take medicine to reach your blood pressure goal.  Follow-up care is a key part of your treatment and safety. Be sure to make and go to all appointments, and call your doctor if you are having problems. It's also a good idea to know your test results and keep a list of the medicines you take. How can you care for yourself at home? Medical treatment  · If you stop taking your medicine, your blood pressure will go back up. You may take one or more types of medicine to lower your blood pressure. Be safe with medicines. Take your medicine exactly as prescribed. Call your doctor if you think you are having a problem with your medicine. · Talk to your doctor before you start taking aspirin every day. Aspirin can help certain people lower their risk of a heart attack or stroke. But taking aspirin isn't right for everyone, because it can cause serious bleeding. · See your doctor regularly. You may need to see the doctor more often at first or until your blood pressure comes down. · If you are taking blood pressure medicine, talk to your doctor before you take decongestants or anti-inflammatory medicine, such as ibuprofen. Some of these medicines can raise blood pressure. · Learn how to check your blood pressure at home. Lifestyle changes  · Stay at a healthy weight. This is especially important if you put on weight around the waist. Losing even 10 pounds can help you lower your blood pressure. · If your doctor recommends it, get more exercise. Walking is a good choice. Bit by bit, increase the amount you walk every day. Try for at least 30 minutes on most days of the week. You also may want to swim, bike, or do other activities. · Avoid or limit alcohol.  Talk to your doctor about whether you can drink any alcohol. · Try to limit how much sodium you eat to less than 2,300 milligrams (mg) a day. Your doctor may ask you to try to eat less than 1,500 mg a day. · Eat plenty of fruits (such as bananas and oranges), vegetables, legumes, whole grains, and low-fat dairy products. · Lower the amount of saturated fat in your diet. Saturated fat is found in animal products such as milk, cheese, and meat. Limiting these foods may help you lose weight and also lower your risk for heart disease. · Do not smoke. Smoking increases your risk for heart attack and stroke. If you need help quitting, talk to your doctor about stop-smoking programs and medicines. These can increase your chances of quitting for good. When should you call for help? Call 911 anytime you think you may need emergency care. This may mean having symptoms that suggest that your blood pressure is causing a serious heart or blood vessel problem. Your blood pressure may be over 180/120.   For example, call 911 if:    · You have symptoms of a heart attack. These may include:  ? Chest pain or pressure, or a strange feeling in the chest.  ? Sweating. ? Shortness of breath. ? Nausea or vomiting. ? Pain, pressure, or a strange feeling in the back, neck, jaw, or upper belly or in one or both shoulders or arms. ? Lightheadedness or sudden weakness. ? A fast or irregular heartbeat.     · You have symptoms of a stroke. These may include:  ? Sudden numbness, tingling, weakness, or loss of movement in your face, arm, or leg, especially on only one side of your body. ? Sudden vision changes. ? Sudden trouble speaking. ? Sudden confusion or trouble understanding simple statements. ? Sudden problems with walking or balance. ? A sudden, severe headache that is different from past headaches.     · You have severe back or belly pain.    Do not wait until your blood pressure comes down on its own.  Get help right away.   Call your doctor now or seek immediate care if:    · Your blood pressure is much higher than normal (such as 180/120 or higher), but you don't have symptoms.     · You think high blood pressure is causing symptoms, such as:  ? Severe headache.  ? Blurry vision.    Watch closely for changes in your health, and be sure to contact your doctor if:    · Your blood pressure measures higher than your doctor recommends at least 2 times. That means the top number is higher or the bottom number is higher, or both.     · You think you may be having side effects from your blood pressure medicine. Where can you learn more? Go to http://shreya-ann.info/. Enter Q828 in the search box to learn more about \"High Blood Pressure: Care Instructions. \"  Current as of: December 6, 2017  Content Version: 11.8  © 7657-0651 [employer]. Care instructions adapted under license by [employer] (which disclaims liability or warranty for this information). If you have questions about a medical condition or this instruction, always ask your healthcare professional. [place] [address] any warranty or liability for your use of this information. Learning About High Blood Pressure  What is high blood pressure? Blood pressure is a measure of how hard the blood pushes against the walls of your arteries. It's normal for blood pressure to go up and down throughout the day, but if it stays up, you have high blood pressure. Another name for high blood pressure is hypertension. Two numbers tell you your blood pressure. The first number is the systolic pressure. It shows how hard the blood pushes when your heart is pumping. The second number is the diastolic pressure. It shows how hard the blood pushes between heartbeats, when your heart is relaxed and filling with blood. A blood pressure of less than 120/80 (say \"120 over 80\") is ideal for an adult. High blood pressure is 130/80 or higher.  You have high blood pressure if your top number is 130 or higher or your bottom number is 80 or higher, or both. What happens when you have high blood pressure? · Blood flows through your arteries with too much force. Over time, this damages the walls of your arteries. But you can't feel it. High blood pressure usually doesn't cause symptoms. · Fat and calcium start to build up in your arteries. This buildup is called plaque. Plaque makes your arteries narrower and stiffer. Blood can't flow through them as easily. · This lack of good blood flow starts to damage some of the organs in your body. This can lead to problems such as coronary artery disease and heart attack, heart failure, stroke, kidney failure, and eye damage. How can you prevent high blood pressure? · Stay at a healthy weight. · Try to limit how much sodium you eat to less than 2,300 milligrams (mg) a day. If you limit your sodium to 1,500 mg a day, you can lower your blood pressure even more. ? Buy foods that are labeled \"unsalted,\" \"sodium-free,\" or \"low-sodium. \" Foods labeled \"reduced-sodium\" and \"light sodium\" may still have too much sodium. ? Flavor your food with garlic, lemon juice, onion, vinegar, herbs, and spices instead of salt. Do not use soy sauce, steak sauce, onion salt, garlic salt, mustard, or ketchup on your food. ? Use less salt (or none) when recipes call for it. You can often use half the salt a recipe calls for without losing flavor. · Be physically active. Get at least 30 minutes of exercise on most days of the week. Walking is a good choice. You also may want to do other activities, such as running, swimming, cycling, or playing tennis or team sports. · Limit alcohol to 2 drinks a day for men and 1 drink a day for women. · Eat plenty of fruits, vegetables, and low-fat dairy products. Eat less saturated and total fats. How is high blood pressure treated? · Your doctor will suggest making lifestyle changes.  For example, your doctor may ask you to eat healthy foods, quit smoking, lose extra weight, and be more active. · If lifestyle changes don't help enough, your doctor may recommend that you take medicine. · When blood pressure is very high, medicines are needed to lower it. Follow-up care is a key part of your treatment and safety. Be sure to make and go to all appointments, and call your doctor if you are having problems. It's also a good idea to know your test results and keep a list of the medicines you take. Where can you learn more? Go to http://shreya-ann.info/. Enter P501 in the search box to learn more about \"Learning About High Blood Pressure. \"  Current as of: December 6, 2017  Content Version: 11.8  © 8488-8814 [employer]. Care instructions adapted under license by [employer] (which disclaims liability or warranty for this information). If you have questions about a medical condition or this instruction, always ask your healthcare professional. [place] [address] any warranty or liability for your use of this information. Well Visit, Ages 25 to 48: Care Instructions  Your Care Instructions    Physical exams can help you stay healthy. Your doctor has checked your overall health and may have suggested ways to take good care of yourself. He or she also may have recommended tests. At home, you can help prevent illness with healthy eating, regular exercise, and other steps. Follow-up care is a key part of your treatment and safety. Be sure to make and go to all appointments, and call your doctor if you are having problems. It's also a good idea to know your test results and keep a list of the medicines you take. How can you care for yourself at home? · Reach and stay at a healthy weight. This will lower your risk for many problems, such as obesity, diabetes, heart disease, and high blood pressure.   · Get at least 30 minutes of physical activity on most days of the week. Walking is a good choice. You also may want to do other activities, such as running, swimming, cycling, or playing tennis or team sports. Discuss any changes in your exercise program with your doctor. · Do not smoke or allow others to smoke around you. If you need help quitting, talk to your doctor about stop-smoking programs and medicines. These can increase your chances of quitting for good. · Talk to your doctor about whether you have any risk factors for sexually transmitted infections (STIs). Having one sex partner (who does not have STIs and does not have sex with anyone else) is a good way to avoid these infections. · Use birth control if you do not want to have children at this time. Talk with your doctor about the choices available and what might be best for you. · Protect your skin from too much sun. When you're outdoors from 10 a.m. to 4 p.m., stay in the shade or cover up with clothing and a hat with a wide brim. Wear sunglasses that block UV rays. Even when it's cloudy, put broad-spectrum sunscreen (SPF 30 or higher) on any exposed skin. · See a dentist one or two times a year for checkups and to have your teeth cleaned. · Wear a seat belt in the car. · Drink alcohol in moderation, if at all. That means no more than 2 drinks a day for men and 1 drink a day for women. Follow your doctor's advice about when to have certain tests. These tests can spot problems early. For everyone  · Cholesterol. Have the fat (cholesterol) in your blood tested after age 21. Your doctor will tell you how often to have this done based on your age, family history, or other things that can increase your risk for heart disease. · Blood pressure. Have your blood pressure checked during a routine doctor visit. Your doctor will tell you how often to check your blood pressure based on your age, your blood pressure results, and other factors. · Vision.  Talk with your doctor about how often to have a glaucoma test.  · Diabetes. Ask your doctor whether you should have tests for diabetes. · Colon cancer. Have a test for colon cancer at age 48. You may have one of several tests. If you are younger than 48, you may need a test earlier if you have any risk factors. Risk factors include whether you already had a precancerous polyp removed from your colon or whether your parent, brother, sister, or child has had colon cancer. For women  · Breast exam and mammogram. Talk to your doctor about when you should have a clinical breast exam and a mammogram. Medical experts differ on whether and how often women under 50 should have these tests. Your doctor can help you decide what is right for you. · Pap test and pelvic exam. Begin Pap tests at age 24. A Pap test is the best way to find cervical cancer. The test often is part of a pelvic exam. Ask how often to have this test.  · Tests for sexually transmitted infections (STIs). Ask whether you should have tests for STIs. You may be at risk if you have sex with more than one person, especially if your partners do not wear condoms. For men  · Tests for sexually transmitted infections (STIs). Ask whether you should have tests for STIs. You may be at risk if you have sex with more than one person, especially if you do not wear a condom. · Testicular cancer exam. Ask your doctor whether you should check your testicles regularly. · Prostate exam. Talk to your doctor about whether you should have a blood test (called a PSA test) for prostate cancer. Experts differ on whether and when men should have this test. Some experts suggest it if you are older than 39 and are -American or have a father or brother who got prostate cancer when he was younger than 72. When should you call for help? Watch closely for changes in your health, and be sure to contact your doctor if you have any problems or symptoms that concern you. Where can you learn more?   Go to http://shreya-ann.info/. Enter P072 in the search box to learn more about \"Well Visit, Ages 25 to 48: Care Instructions. \"  Current as of: March 29, 2018  Content Version: 11.8  © 9955-3454 [employer]. Care instructions adapted under license by [employer] (which disclaims liability or warranty for this information). If you have questions about a medical condition or this instruction, always ask your healthcare professional. [address] any warranty or liability for your use of this information. no

## 2021-04-21 ENCOUNTER — OFFICE VISIT (OUTPATIENT)
Dept: INTERNAL MEDICINE CLINIC | Age: 46
End: 2021-04-21
Payer: MEDICARE

## 2021-04-21 VITALS
DIASTOLIC BLOOD PRESSURE: 98 MMHG | BODY MASS INDEX: 34.65 KG/M2 | HEART RATE: 88 BPM | HEIGHT: 66 IN | OXYGEN SATURATION: 100 % | RESPIRATION RATE: 16 BRPM | TEMPERATURE: 98.5 F | SYSTOLIC BLOOD PRESSURE: 136 MMHG | WEIGHT: 215.6 LBS

## 2021-04-21 DIAGNOSIS — E66.9 OBESITY (BMI 30.0-34.9): ICD-10-CM

## 2021-04-21 DIAGNOSIS — I10 ESSENTIAL HYPERTENSION: Primary | ICD-10-CM

## 2021-04-21 DIAGNOSIS — E78.00 PURE HYPERCHOLESTEROLEMIA: ICD-10-CM

## 2021-04-21 DIAGNOSIS — R82.90 ABNORMAL URINE: ICD-10-CM

## 2021-04-21 DIAGNOSIS — R74.8 ELEVATED LIVER ENZYMES: ICD-10-CM

## 2021-04-21 DIAGNOSIS — F99 PSYCHIATRIC DISORDER: ICD-10-CM

## 2021-04-21 LAB
ALBUMIN SERPL-MCNC: 3.8 G/DL (ref 3.5–5)
ALBUMIN/GLOB SERPL: 1 {RATIO} (ref 1.1–2.2)
ALP SERPL-CCNC: 151 U/L (ref 45–117)
ALT SERPL-CCNC: 41 U/L (ref 12–78)
ANION GAP SERPL CALC-SCNC: 4 MMOL/L (ref 5–15)
APPEARANCE UR: ABNORMAL
AST SERPL-CCNC: 41 U/L (ref 15–37)
BACTERIA URNS QL MICRO: ABNORMAL /HPF
BASOPHILS # BLD: 0.1 K/UL (ref 0–0.1)
BASOPHILS NFR BLD: 1 % (ref 0–1)
BILIRUB SERPL-MCNC: 0.5 MG/DL (ref 0.2–1)
BILIRUB UR QL STRIP: NEGATIVE
BILIRUB UR QL: NEGATIVE
BUN SERPL-MCNC: 13 MG/DL (ref 6–20)
BUN/CREAT SERPL: 14 (ref 12–20)
CALCIUM SERPL-MCNC: 9.3 MG/DL (ref 8.5–10.1)
CHLORIDE SERPL-SCNC: 106 MMOL/L (ref 97–108)
CHOLEST SERPL-MCNC: 190 MG/DL
CO2 SERPL-SCNC: 28 MMOL/L (ref 21–32)
COLOR UR: ABNORMAL
CREAT SERPL-MCNC: 0.96 MG/DL (ref 0.55–1.02)
DIFFERENTIAL METHOD BLD: ABNORMAL
EOSINOPHIL # BLD: 0.2 K/UL (ref 0–0.4)
EOSINOPHIL NFR BLD: 2 % (ref 0–7)
EPITH CASTS URNS QL MICRO: ABNORMAL /LPF
ERYTHROCYTE [DISTWIDTH] IN BLOOD BY AUTOMATED COUNT: 12.4 % (ref 11.5–14.5)
GLOBULIN SER CALC-MCNC: 3.8 G/DL (ref 2–4)
GLUCOSE SERPL-MCNC: 100 MG/DL (ref 65–100)
GLUCOSE UR STRIP.AUTO-MCNC: NEGATIVE MG/DL
GLUCOSE UR-MCNC: NEGATIVE MG/DL
HCT VFR BLD AUTO: 42.7 % (ref 35–47)
HCV AB SERPL QL IA: NONREACTIVE
HCV COMMENT,HCGAC: NORMAL
HDLC SERPL-MCNC: 41 MG/DL
HDLC SERPL: 4.6 {RATIO} (ref 0–5)
HGB BLD-MCNC: 14.3 G/DL (ref 11.5–16)
HGB UR QL STRIP: ABNORMAL
HYALINE CASTS URNS QL MICRO: ABNORMAL /LPF (ref 0–5)
IMM GRANULOCYTES # BLD AUTO: 0.1 K/UL (ref 0–0.04)
IMM GRANULOCYTES NFR BLD AUTO: 1 % (ref 0–0.5)
KETONES P FAST UR STRIP-MCNC: NEGATIVE MG/DL
KETONES UR QL STRIP.AUTO: NEGATIVE MG/DL
LDLC SERPL CALC-MCNC: 94.2 MG/DL (ref 0–100)
LEUKOCYTE ESTERASE UR QL STRIP.AUTO: ABNORMAL
LIPID PROFILE,FLP: ABNORMAL
LYMPHOCYTES # BLD: 2.6 K/UL (ref 0.8–3.5)
LYMPHOCYTES NFR BLD: 30 % (ref 12–49)
MCH RBC QN AUTO: 30.6 PG (ref 26–34)
MCHC RBC AUTO-ENTMCNC: 33.5 G/DL (ref 30–36.5)
MCV RBC AUTO: 91.2 FL (ref 80–99)
MONOCYTES # BLD: 0.5 K/UL (ref 0–1)
MONOCYTES NFR BLD: 6 % (ref 5–13)
NEUTS SEG # BLD: 5.2 K/UL (ref 1.8–8)
NEUTS SEG NFR BLD: 60 % (ref 32–75)
NITRITE UR QL STRIP.AUTO: POSITIVE
NRBC # BLD: 0 K/UL (ref 0–0.01)
NRBC BLD-RTO: 0 PER 100 WBC
PH UR STRIP: 6.5 [PH] (ref 4.6–8)
PH UR STRIP: 6.5 [PH] (ref 5–8)
PLATELET # BLD AUTO: 293 K/UL (ref 150–400)
PMV BLD AUTO: 8.5 FL (ref 8.9–12.9)
POTASSIUM SERPL-SCNC: 4.2 MMOL/L (ref 3.5–5.1)
PROT SERPL-MCNC: 7.6 G/DL (ref 6.4–8.2)
PROT UR QL STRIP: NEGATIVE
PROT UR STRIP-MCNC: NEGATIVE MG/DL
RBC # BLD AUTO: 4.68 M/UL (ref 3.8–5.2)
RBC #/AREA URNS HPF: ABNORMAL /HPF (ref 0–5)
SODIUM SERPL-SCNC: 138 MMOL/L (ref 136–145)
SP GR UR REFRACTOMETRY: 1.01 (ref 1–1.03)
SP GR UR STRIP: 1.01 (ref 1–1.03)
TRIGL SERPL-MCNC: 274 MG/DL (ref ?–150)
UA UROBILINOGEN AMB POC: NORMAL (ref 0.2–1)
UA: UC IF INDICATED,UAUC: ABNORMAL
URINALYSIS CLARITY POC: CLEAR
URINALYSIS COLOR POC: YELLOW
URINE BLOOD POC: NORMAL
URINE LEUKOCYTES POC: NORMAL
URINE NITRITES POC: POSITIVE
UROBILINOGEN UR QL STRIP.AUTO: 0.2 EU/DL (ref 0.2–1)
VLDLC SERPL CALC-MCNC: 54.8 MG/DL
WBC # BLD AUTO: 8.6 K/UL (ref 3.6–11)
WBC URNS QL MICRO: ABNORMAL /HPF (ref 0–4)

## 2021-04-21 PROCEDURE — 99214 OFFICE O/P EST MOD 30 MIN: CPT | Performed by: NURSE PRACTITIONER

## 2021-04-21 PROCEDURE — G8752 SYS BP LESS 140: HCPCS | Performed by: NURSE PRACTITIONER

## 2021-04-21 PROCEDURE — G8755 DIAS BP > OR = 90: HCPCS | Performed by: NURSE PRACTITIONER

## 2021-04-21 PROCEDURE — G8417 CALC BMI ABV UP PARAM F/U: HCPCS | Performed by: NURSE PRACTITIONER

## 2021-04-21 PROCEDURE — 81003 URINALYSIS AUTO W/O SCOPE: CPT | Performed by: NURSE PRACTITIONER

## 2021-04-21 PROCEDURE — G8427 DOCREV CUR MEDS BY ELIG CLIN: HCPCS | Performed by: NURSE PRACTITIONER

## 2021-04-21 PROCEDURE — G8432 DEP SCR NOT DOC, RNG: HCPCS | Performed by: NURSE PRACTITIONER

## 2021-04-21 RX ORDER — FLUPHENAZINE HYDROCHLORIDE 10 MG/1
TABLET ORAL
Qty: 180 TAB | Refills: 1 | Status: SHIPPED | OUTPATIENT
Start: 2021-04-21 | End: 2022-08-18

## 2021-04-21 RX ORDER — CARBAMAZEPINE 200 MG/1
200 TABLET, EXTENDED RELEASE ORAL
Qty: 90 TAB | Refills: 1 | Status: SHIPPED | OUTPATIENT
Start: 2021-04-21 | End: 2022-01-06

## 2021-04-21 RX ORDER — AMLODIPINE BESYLATE 10 MG/1
10 TABLET ORAL DAILY
Qty: 30 TAB | Refills: 3 | Status: SHIPPED | OUTPATIENT
Start: 2021-04-21 | End: 2021-09-30

## 2021-04-21 NOTE — PATIENT INSTRUCTIONS
Body Mass Index: Care Instructions  Your Care Instructions     Body mass index (BMI) can help you see if your weight is raising your risk for health problems. It uses a formula to compare how much you weigh with how tall you are. · A BMI lower than 18.5 is considered underweight. · A BMI between 18.5 and 24.9 is considered healthy. · A BMI between 25 and 29.9 is considered overweight. A BMI of 30 or higher is considered obese. If your BMI is in the normal range, it means that you have a lower risk for weight-related health problems. If your BMI is in the overweight or obese range, you may be at increased risk for weight-related health problems, such as high blood pressure, heart disease, stroke, arthritis or joint pain, and diabetes. If your BMI is in the underweight range, you may be at increased risk for health problems such as fatigue, lower protection (immunity) against illness, muscle loss, bone loss, hair loss, and hormone problems. BMI is just one measure of your risk for weight-related health problems. You may be at higher risk for health problems if you are not active, you eat an unhealthy diet, or you drink too much alcohol or use tobacco products. Follow-up care is a key part of your treatment and safety. Be sure to make and go to all appointments, and call your doctor if you are having problems. It's also a good idea to know your test results and keep a list of the medicines you take. How can you care for yourself at home? · Practice healthy eating habits. This includes eating plenty of fruits, vegetables, whole grains, lean protein, and low-fat dairy. · If your doctor recommends it, get more exercise. Walking is a good choice. Bit by bit, increase the amount you walk every day. Try for at least 30 minutes on most days of the week. · Do not smoke. Smoking can increase your risk for health problems. If you need help quitting, talk to your doctor about stop-smoking programs and medicines. These can increase your chances of quitting for good. · Limit alcohol to 2 drinks a day for men and 1 drink a day for women. Too much alcohol can cause health problems. If you have a BMI higher than 25  · Your doctor may do other tests to check your risk for weight-related health problems. This may include measuring the distance around your waist. A waist measurement of more than 40 inches in men or 35 inches in women can increase the risk of weight-related health problems. · Talk with your doctor about steps you can take to stay healthy or improve your health. You may need to make lifestyle changes to lose weight and stay healthy, such as changing your diet and getting regular exercise. If you have a BMI lower than 18.5  · Your doctor may do other tests to check your risk for health problems. · Talk with your doctor about steps you can take to stay healthy or improve your health. You may need to make lifestyle changes to gain or maintain weight and stay healthy, such as getting more healthy foods in your diet and doing exercises to build muscle. Where can you learn more? Go to http://www.white.com/  Enter S176 in the search box to learn more about \"Body Mass Index: Care Instructions. \"  Current as of: September 23, 2020               Content Version: 12.8  © 2006-2021 [employer], [employer]. Care instructions adapted under license by [employer] (which disclaims liability or warranty for this information). If you have questions about a medical condition or this instruction, always ask your healthcare professional. [place] [address] any warranty or liability for your use of this information. DASH Diet: Care Instructions  Your Care Instructions     The DASH diet is an eating plan that can help lower your blood pressure. DASH stands for Dietary Approaches to Stop Hypertension. Hypertension is high blood pressure.   The DASH diet focuses on eating foods that are high in calcium, potassium, and magnesium. These nutrients can lower blood pressure. The foods that are highest in these nutrients are fruits, vegetables, low-fat dairy products, nuts, seeds, and legumes. But taking calcium, potassium, and magnesium supplements instead of eating foods that are high in those nutrients does not have the same effect. The DASH diet also includes whole grains, fish, and poultry. The DASH diet is one of several lifestyle changes your doctor may recommend to lower your high blood pressure. Your doctor may also want you to decrease the amount of sodium in your diet. Lowering sodium while following the DASH diet can lower blood pressure even further than just the DASH diet alone. Follow-up care is a key part of your treatment and safety. Be sure to make and go to all appointments, and call your doctor if you are having problems. It's also a good idea to know your test results and keep a list of the medicines you take. How can you care for yourself at home? Following the DASH diet  · Eat 4 to 5 servings of fruit each day. A serving is 1 medium-sized piece of fruit, ½ cup chopped or canned fruit, 1/4 cup dried fruit, or 4 ounces (½ cup) of fruit juice. Choose fruit more often than fruit juice. · Eat 4 to 5 servings of vegetables each day. A serving is 1 cup of lettuce or raw leafy vegetables, ½ cup of chopped or cooked vegetables, or 4 ounces (½ cup) of vegetable juice. Choose vegetables more often than vegetable juice. · Get 2 to 3 servings of low-fat and fat-free dairy each day. A serving is 8 ounces of milk, 1 cup of yogurt, or 1 ½ ounces of cheese. · Eat 6 to 8 servings of grains each day. A serving is 1 slice of bread, 1 ounce of dry cereal, or ½ cup of cooked rice, pasta, or cooked cereal. Try to choose whole-grain products as much as possible. · Limit lean meat, poultry, and fish to 2 servings each day.  A serving is 3 ounces, about the size of a deck of cards.  · Eat 4 to 5 servings of nuts, seeds, and legumes (cooked dried beans, lentils, and split peas) each week. A serving is 1/3 cup of nuts, 2 tablespoons of seeds, or ½ cup of cooked beans or peas. · Limit fats and oils to 2 to 3 servings each day. A serving is 1 teaspoon of vegetable oil or 2 tablespoons of salad dressing. · Limit sweets and added sugars to 5 servings or less a week. A serving is 1 tablespoon jelly or jam, ½ cup sorbet, or 1 cup of lemonade. · Eat less than 2,300 milligrams (mg) of sodium a day. If you limit your sodium to 1,500 mg a day, you can lower your blood pressure even more. · Be aware that all of these are the suggested number of servings for people who eat 1,800 to 2,000 calories a day. Your recommended number of servings may be different if you need more or fewer calories. Tips for success  · Start small. Do not try to make dramatic changes to your diet all at once. You might feel that you are missing out on your favorite foods and then be more likely to not follow the plan. Make small changes, and stick with them. Once those changes become habit, add a few more changes. · Try some of the following:  ? Make it a goal to eat a fruit or vegetable at every meal and at snacks. This will make it easy to get the recommended amount of fruits and vegetables each day. ? Try yogurt topped with fruit and nuts for a snack or healthy dessert. ? Add lettuce, tomato, cucumber, and onion to sandwiches. ? Combine a ready-made pizza crust with low-fat mozzarella cheese and lots of vegetable toppings. Try using tomatoes, squash, spinach, broccoli, carrots, cauliflower, and onions. ? Have a variety of cut-up vegetables with a low-fat dip as an appetizer instead of chips and dip. ? Sprinkle sunflower seeds or chopped almonds over salads. Or try adding chopped walnuts or almonds to cooked vegetables. ? Try some vegetarian meals using beans and peas. Add garbanzo or kidney beans to salads. Make burritos and tacos with mashed santana beans or black beans. Where can you learn more? Go to http://www.[employer].com/  Enter H967 in the search box to learn more about \"DASH Diet: Care Instructions. \"  Current as of: August 31, 2020               Content Version: 12.8  © 2006-2021 [employer]. Care instructions adapted under license by [employer] (which disclaims liability or warranty for this information). If you have questions about a medical condition or this instruction, always ask your healthcare professional. [address] any warranty or liability for your use of this information. High Cholesterol: Care Instructions  Your Care Instructions     Cholesterol is a type of fat in your blood. It is needed for many body functions, such as making new cells. Cholesterol is made by your body. It also comes from food you eat. High cholesterol means that you have too much of the fat in your blood. This raises your risk of a heart attack and stroke. LDL and HDL are part of your total cholesterol. LDL is the \"bad\" cholesterol. High LDL can raise your risk for heart disease, heart attack, and stroke. HDL is the \"good\" cholesterol. It helps clear bad cholesterol from the body. High HDL is linked with a lower risk of heart disease, heart attack, and stroke. Your cholesterol levels help your doctor find out your risk for having a heart attack or stroke. You and your doctor can talk about whether you need to lower your risk and what treatment is best for you. A heart-healthy lifestyle along with medicines can help lower your cholesterol and your risk. The way you choose to lower your risk will depend on how high your risk is for heart attack and stroke. It will also depend on how you feel about taking medicines. Follow-up care is a key part of your treatment and safety.  Be sure to make and go to all appointments, and call your doctor if you are having problems. It's also a good idea to know your test results and keep a list of the medicines you take. How can you care for yourself at home? · Eat a variety of foods every day. Good choices include fruits, vegetables, whole grains (like oatmeal), dried beans and peas, nuts and seeds, soy products (like tofu), and fat-free or low-fat dairy products. · Replace butter, margarine, and hydrogenated or partially hydrogenated oils with olive and canola oils. (Canola oil margarine without trans fat is fine.)  · Replace red meat with fish, poultry, and soy protein (like tofu). · Limit processed and packaged foods like chips, crackers, and cookies. · Bake, broil, or steam foods. Don't jose them. · Be physically active. Get at least 30 minutes of exercise on most days of the week. Walking is a good choice. You also may want to do other activities, such as running, swimming, cycling, or playing tennis or team sports. · Stay at a healthy weight or lose weight by making the changes in eating and physical activity listed above. Losing just a small amount of weight, even 5 to 10 pounds, can reduce your risk for having a heart attack or stroke. · Do not smoke. When should you call for help? Watch closely for changes in your health, and be sure to contact your doctor if:    · You need help making lifestyle changes.     · You have questions about your medicine. Where can you learn more? Go to http://www.gray.com/  Enter D508 in the search box to learn more about \"High Cholesterol: Care Instructions. \"  Current as of: August 31, 2020               Content Version: 12.8  © 9872-3751 [employer]. Care instructions adapted under license by [employer] (which disclaims liability or warranty for this information).  If you have questions about a medical condition or this instruction, always ask your healthcare professional. Amy Osorio disclaims any warranty or liability for your use of this information. Learning About High Blood Pressure  What is high blood pressure? Blood pressure is a measure of how hard the blood pushes against the walls of your arteries. It's normal for blood pressure to go up and down throughout the day. But if it stays up, you have high blood pressure. Another name for high blood pressure is hypertension. Two numbers tell you your blood pressure. The first number is the systolic pressure (top number). It shows how hard the blood pushes when your heart is pumping. The second number is the diastolic pressure (bottom number). It shows how hard the blood pushes between heartbeats, when your heart is relaxed and filling with blood. Your doctor will give you a goal for your blood pressure based on your health and your age. High blood pressure (hypertension) means that the top number stays high, or the bottom number stays high, or both. High blood pressure increases the risk of stroke, heart attack, and other problems. What happens when you have high blood pressure? · Blood flows through your arteries with too much force. Over time, this can damage the heart and the walls of your arteries. But you can't feel it. High blood pressure usually doesn't cause symptoms. · High blood pressure makes your heart work harder. And that can lead to heart failure, which means your heart doesn't pump as much blood as your body needs. · Fat and calcium start to build up in your arteries. This buildup is called hardening of the arteries. It can cause many problems including a heart attack and stroke. · Arteries also carry blood and oxygen to organs like your eyes, kidneys, and brain. If high blood pressure damages those arteries, it can lead to vision loss, kidney disease, stroke, and a higher risk of dementia. How can you prevent high blood pressure? · Stay at a healthy weight.   · Try to limit how much sodium you eat to less than 2,300 milligrams (mg) a day. If you limit your sodium to 1,500 mg a day, you can lower your blood pressure even more. ? Buy foods that are labeled \"unsalted,\" \"sodium-free,\" or \"low-sodium. \" Foods labeled \"reduced-sodium\" and \"light sodium\" may still have too much sodium. ? Flavor your food with garlic, lemon juice, onion, vinegar, herbs, and spices instead of salt. Do not use soy sauce, steak sauce, onion salt, garlic salt, mustard, or ketchup on your food. ? Use less salt (or none) when recipes call for it. You can often use half the salt a recipe calls for without losing flavor. · Be physically active. Get at least 30 minutes of exercise on most days of the week. Walking is a good choice. You also may want to do other activities, such as running, swimming, cycling, or playing tennis or team sports. · Limit alcohol to 2 drinks a day for men and 1 drink a day for women. · Eat plenty of fruits, vegetables, and low-fat dairy products. Eat less saturated and total fats. How is high blood pressure treated? · Your doctor will suggest making lifestyle changes to help your heart. For example, your doctor may ask you to eat healthy foods, quit smoking, lose extra weight, and be more active. · If lifestyle changes don't help enough, your doctor may recommend that you take medicine. · When blood pressure is very high, medicines are needed to lower it. Follow-up care is a key part of your treatment and safety. Be sure to make and go to all appointments, and call your doctor if you are having problems. It's also a good idea to know your test results and keep a list of the medicines you take. Where can you learn more? Go to http://www.[employer].com/  Enter P501 in the search box to learn more about \"Learning About High Blood Pressure. \"  Current as of: August 31, 2020               Content Version: 12.8  © 1855-3459 [employer].    Care instructions adapted under license by [employer] (which disclaims liability or warranty for this information). If you have questions about a medical condition or this instruction, always ask your healthcare professional. [address] any warranty or liability for your use of this information.

## 2021-04-21 NOTE — PROGRESS NOTES
Rm 7  Recent Travel Screening and Travel History documentation     Travel Screening     Question   Response    In the last month, have you been in contact with someone who was confirmed or suspected to have Coronavirus / COVID-19? No / Unsure    Have you had a COVID-19 viral test in the last 14 days? No    Do you have any of the following new or worsening symptoms? None of these    Have you traveled internationally or domestically in the last month? No      Travel History   Travel since 03/21/21     No documented travel since 03/21/21          Chief Complaint   Patient presents with    Hypertension   Pt is fasting    She would like her psych meds filled today if possible until she is able to get another doctor. 1. Have you been to the ER, urgent care clinic since your last visit? Hospitalized since your last visit? No    2. Have you seen or consulted any other health care providers outside of the [address] [place] [address] since your last visit? Include any pap smears or colon screening. No        Health Maintenance Due   Topic Date Due    Hepatitis C Screening  Never done    COVID-19 Vaccine (1) Never done    DTaP/Tdap/Td series (1 - Tdap) Never done    Medicare Yearly Exam  12/27/2019           3 most recent PHQ Screens 4/21/2021   PHQ Not Done -   Little interest or pleasure in doing things Not at all   Feeling down, depressed, irritable, or hopeless Not at all   Total Score PHQ 2 0           Learning Assessment 5/22/2019   PRIMARY LEARNER Patient   HIGHEST LEVEL OF EDUCATION - PRIMARY LEARNER  SOME COLLEGE   BARRIERS PRIMARY LEARNER NONE   CO-LEARNER CAREGIVER No   PRIMARY LANGUAGE ENGLISH   LEARNER PREFERENCE PRIMARY READING   ANSWERED BY pt   RELATIONSHIP SELF         An After Visit Summary was printed and given to the patient.

## 2021-04-21 NOTE — PROGRESS NOTES
Subjective  Tom Grider is a 41 y.o. female presents for hypertension follow up  HPI        Discharged by psychiatrist d/t missed appointments, phone was broken   Has new mental health provider at [facility], initial appointment in September. Requests psychiatric medication refill until then. Blood pressure readings higher; 's   No issues. No stressors    Walks everyday  Drinking lots of water  Eating a lot more red meat   Will have mammogram in 2 weeks  Needs new gyn provider     Past Medical History:   Diagnosis Date    Bipolar 1 disorder (HCC)     Depression     Eczema     Elevated lipids     History of kidney stones        Current Outpatient Medications   Medication Sig    fluPHENAZine (PROLIXIN) 10 mg tablet take 1 tablet by mouth twice a day for 30 DAYS    carBAMazepine XR (TEGretol XR) 200 mg SR tablet Take 1 Tab by mouth nightly.  amLODIPine (NORVASC) 10 mg tablet Take 1 Tab by mouth daily.  Biotin 2,500 mcg cap Take  by mouth daily.  triamcinolone acetonide (KENALOG) 0.025 % topical cream Apply  to affected area two (2) times a day. use thin layer    ibuprofen (MOTRIN) 800 mg tablet Take 1 tablet by mouth every eight (8) hours as needed for Pain. No current facility-administered medications for this visit. Allergies   Allergen Reactions    Avocado Hives    Maycol Seed Hives    Geodon [Ziprasidone Hcl] Other (comments)     Pt states it makes her want to commit sucide.  Haldol [Haloperidol Lactate] Other (comments)     Extreme body aches    Penicillins Other (comments)     Allergy testing    Sulfur Other (comments)     Allergy testing       Past Surgical History:   Procedure Laterality Date    HX LITHOTRIPSY         Family History   Problem Relation Age of Onset    Cancer Mother     Lung Disease Mother     Diabetes Father        Review of Systems   Constitutional: Negative. Eyes: Negative. Glasses    Respiratory: Negative. Cardiovascular: Negative. Gastrointestinal: Negative. Genitourinary: Negative. Musculoskeletal: Negative. Neurological: Negative for dizziness and headaches. Psychiatric/Behavioral: Negative. Objective    Visit Vitals  BP (!) 136/98 (BP 1 Location: Left upper arm, BP Patient Position: Sitting, BP Cuff Size: Adult long)   Pulse 88   Temp 98.5 °F (36.9 °C) (Oral)   Resp 16   Ht 5' 5.98\" (1.676 m)   Wt 215 lb 9.6 oz (97.8 kg)   SpO2 100%   BMI 34.82 kg/m²     Physical Exam  Constitutional:       Appearance: Normal appearance. Cardiovascular:      Rate and Rhythm: Normal rate and regular rhythm. Pulses: Normal pulses. Heart sounds: Normal heart sounds. Pulmonary:      Effort: Pulmonary effort is normal.      Breath sounds: Normal breath sounds. Musculoskeletal:      Right lower leg: No edema. Left lower leg: No edema. Skin:     General: Skin is warm and dry. Findings: No erythema or rash. Neurological:      Mental Status: She is alert and oriented to person, place, and time. Mental status is at baseline. Gait: Gait normal.   Psychiatric:         Mood and Affect: Mood normal.         Thought Content: Thought content normal.          Assessment & Plan    ICD-10-CM ICD-9-CM    1. Essential hypertension  N91 854.0 METABOLIC PANEL, COMPREHENSIVE      AMB POC URINALYSIS DIP STICK AUTO W/O MICRO      METABOLIC PANEL, COMPREHENSIVE   2. Pure hypercholesterolemia  Q56.50 606.8 METABOLIC PANEL, COMPREHENSIVE      METABOLIC PANEL, COMPREHENSIVE   3. Elevated liver enzymes  R74.8 790.5 LIPID PANEL      METABOLIC PANEL, COMPREHENSIVE      amLODIPine (NORVASC) 10 mg tablet      AMB POC URINALYSIS DIP STICK AUTO W/O MICRO      HEPATITIS C AB      HEPATITIS C AB      METABOLIC PANEL, COMPREHENSIVE      LIPID PANEL   4. Psychiatric disorder  F99 298.9 fluPHENAZine (PROLIXIN) 10 mg tablet      carBAMazepine XR (TEGretol XR) 200 mg SR tablet      CBC WITH AUTOMATED DIFF      CBC WITH AUTOMATED DIFF   5. Obesity (BMI 30.0-34. 9)  E66.9 278.00    6. Abnormal urine  R82.90 791.9 URINALYSIS W/ REFLEX CULTURE      URINALYSIS W/ REFLEX CULTURE     Follow-up and Dispositions    · Return in about 4 weeks (around 5/19/2021) for htn. Hypertension inadequately controlled. Increase Amlodipine to 10 mg, reviewed lifestyle management    Will bridge psychiatric  medications, advised to keep appointment with new provider     lab results and schedule of future lab studies reviewed with patient  reviewed diet, exercise and weight control  reviewed medications and side effects in detail    Patient voices understanding and acceptance of this advice and will call back if any further questions or concerns.   Aleksander Richey NP

## 2021-04-24 LAB
BACTERIA SPEC CULT: ABNORMAL
CC UR VC: ABNORMAL
SERVICE CMNT-IMP: ABNORMAL

## 2021-04-26 ENCOUNTER — TELEPHONE (OUTPATIENT)
Dept: INTERNAL MEDICINE CLINIC | Age: 46
End: 2021-04-26

## 2021-04-26 RX ORDER — CIPROFLOXACIN 500 MG/1
500 TABLET ORAL 2 TIMES DAILY
Qty: 14 TAB | Refills: 0 | Status: SHIPPED | OUTPATIENT
Start: 2021-04-26 | End: 2021-05-03

## 2021-05-05 ENCOUNTER — TRANSCRIBE ORDER (OUTPATIENT)
Dept: SCHEDULING | Age: 46
End: 2021-05-05

## 2021-05-05 DIAGNOSIS — Z12.31 OTHER SCREENING MAMMOGRAM: Primary | ICD-10-CM

## 2021-05-14 ENCOUNTER — TRANSCRIBE ORDER (OUTPATIENT)
Dept: GENERAL RADIOLOGY | Age: 46
End: 2021-05-14

## 2021-05-14 ENCOUNTER — HOSPITAL ENCOUNTER (OUTPATIENT)
Dept: MAMMOGRAPHY | Age: 46
Discharge: HOME | End: 2021-05-14
Attending: NURSE PRACTITIONER
Payer: MEDICARE

## 2021-05-14 DIAGNOSIS — Z12.31 VISIT FOR SCREENING MAMMOGRAM: ICD-10-CM

## 2021-05-14 DIAGNOSIS — Z12.31 VISIT FOR SCREENING MAMMOGRAM: Primary | ICD-10-CM

## 2021-05-14 PROCEDURE — 77063 BREAST TOMOSYNTHESIS BI: CPT

## 2021-05-17 ENCOUNTER — TRANSCRIBE ORDER (OUTPATIENT)
Dept: GENERAL RADIOLOGY | Age: 46
End: 2021-05-17

## 2021-05-17 DIAGNOSIS — R92.8 ABNORMAL MAMMOGRAM OF LEFT BREAST: Primary | ICD-10-CM

## 2021-05-21 ENCOUNTER — OFFICE VISIT (OUTPATIENT)
Dept: INTERNAL MEDICINE CLINIC | Age: 46
End: 2021-05-21
Payer: MEDICARE

## 2021-05-21 VITALS
OXYGEN SATURATION: 99 % | TEMPERATURE: 98.9 F | SYSTOLIC BLOOD PRESSURE: 131 MMHG | WEIGHT: 212.6 LBS | BODY MASS INDEX: 34.17 KG/M2 | HEART RATE: 81 BPM | RESPIRATION RATE: 16 BRPM | HEIGHT: 66 IN | DIASTOLIC BLOOD PRESSURE: 86 MMHG

## 2021-05-21 DIAGNOSIS — R74.8 ELEVATED LIVER ENZYMES: ICD-10-CM

## 2021-05-21 DIAGNOSIS — I10 ESSENTIAL HYPERTENSION: Primary | ICD-10-CM

## 2021-05-21 DIAGNOSIS — E78.00 PURE HYPERCHOLESTEROLEMIA: ICD-10-CM

## 2021-05-21 PROCEDURE — 99212 OFFICE O/P EST SF 10 MIN: CPT | Performed by: NURSE PRACTITIONER

## 2021-05-21 PROCEDURE — G8432 DEP SCR NOT DOC, RNG: HCPCS | Performed by: NURSE PRACTITIONER

## 2021-05-21 PROCEDURE — G8427 DOCREV CUR MEDS BY ELIG CLIN: HCPCS | Performed by: NURSE PRACTITIONER

## 2021-05-21 PROCEDURE — G8752 SYS BP LESS 140: HCPCS | Performed by: NURSE PRACTITIONER

## 2021-05-21 PROCEDURE — G8754 DIAS BP LESS 90: HCPCS | Performed by: NURSE PRACTITIONER

## 2021-05-21 PROCEDURE — G8417 CALC BMI ABV UP PARAM F/U: HCPCS | Performed by: NURSE PRACTITIONER

## 2021-05-21 NOTE — PATIENT INSTRUCTIONS
Home Blood Pressure Test: About This Test 
What is it? A home blood pressure test allows you to keep track of your blood pressure at home. Blood pressure is a measure of the force of blood against the walls of your arteries. Blood pressure readings include two numbers, such as 130/80 (say \"130 over 80\"). The first number is the systolic pressure. The second number is the diastolic pressure. Why is this test done? You may do this test at home to: · Find out if you have high blood pressure. · Track your blood pressure if you have high blood pressure. · Track how well medicine is working to reduce high blood pressure. · Check how lifestyle changes, such as weight loss and exercise, are affecting blood pressure. How do you prepare for the test? 
For at least 30 minutes before you take your blood pressure, don't exercise, drink caffeine, or smoke. Empty your bladder before the test. Sit quietly with your back straight and both feet on the floor for at least 5 minutes. This helps you take your blood pressure while you feel comfortable and relaxed. How is the test done? · If your doctor recommends it, take your blood pressure twice a day. Take it in the morning and evening. · Sit with your arm slightly bent and resting on a table so that your upper arm is at the same level as your heart. · Use the same arm each time you take your blood pressure. · Place the blood pressure cuff on the bare skin of your upper arm. You may have to roll up your sleeve, remove your arm from the sleeve, or take your shirt off. · Wrap the blood pressure cuff around your upper arm so that the lower edge of the cuff is about 1 inch above the bend of your elbow. · Do not move, talk, or text while you take your blood pressure. Follow the instructions that came with your blood pressure monitor. They might be different from the following. · Press the on/off button on the automatic monitor.  Then you may need to wait until the screen says the monitor is ready. · Press the start button. The cuff will inflate and deflate by itself. · Your blood pressure numbers will appear on the screen. · Wait one minute and take your blood pressure again. · If your monitor does not automatically save your numbers, write them in your log book, along with the date and time. Follow-up care is a key part of your treatment and safety. Be sure to make and go to all appointments, and call your doctor if you are having problems. It's also a good idea to keep a list of the medicines you take. Where can you learn more? Go to http://www.white.com/ Enter C427 in the search box to learn more about \"Home Blood Pressure Test: About This Test.\" Current as of: August 31, 2020               Content Version: 12.8 © 6230-9777 [employer]. Care instructions adapted under license by [employer] (which disclaims liability or warranty for this information). If you have questions about a medical condition or this instruction, always ask your healthcare professional. [place] [address] any warranty or liability for your use of this information. Learning About High Cholesterol What is high cholesterol? High cholesterol means that you have too much cholesterol in your blood. Cholesterol is a type of fat. It's needed for many body functions, such as making new cells. Cholesterol is made by your body. It also comes from food you eat. Having high cholesterol can lead to the buildup of plaque in artery walls. This can increase your risk of heart disease and stroke. When your doctor talks about high cholesterol levels, he or she is talking about your total cholesterol and LDL cholesterol (the \"bad\" cholesterol) levels. Your doctor may also speak about HDL (the \"good\" cholesterol) levels. High HDL is linked with a lower risk for heart disease, heart attack, and stroke.  
Your cholesterol levels help your doctor find out your risk for having a heart attack or stroke. How can you prevent high cholesterol? A heart-healthy lifestyle can help you prevent high cholesterol. This lifestyle helps lower your risk for a heart attack and stroke. · Eat heart-healthy foods. ? Eat fruits, vegetables, whole grains (like oatmeal), dried beans and peas, nuts and seeds, soy products (like tofu), and fat-free or low-fat dairy products. ? Replace butter, margarine, and hydrogenated or partially hydrogenated oils with olive and canola oils. (Canola oil margarine without trans fat is fine.) ? Replace red meat with fish, poultry, and soy protein (like tofu). ? Limit processed and packaged foods like chips, crackers, and cookies. · Be active. Exercise can improve your cholesterol level. Get at least 30 minutes of exercise on most days of the week. Walking is a good choice. You also may want to do other activities, such as running, swimming, cycling, or playing tennis or team sports. · Stay at a healthy weight. Lose weight if you need to. · Don't smoke. If you need help quitting, talk to your doctor about stop-smoking programs and medicines. These can increase your chances of quitting for good. How is high cholesterol treated? The goal of treatment is to reduce your chances of having a heart attack or stroke. The goal is not to lower your cholesterol numbers only. · You may make lifestyle changes, such as eating healthy foods, not smoking, losing weight, and being more active. · You may have to take medicine. Follow-up care is a key part of your treatment and safety. Be sure to make and go to all appointments, and call your doctor if you are having problems. It's also a good idea to know your test results and keep a list of the medicines you take. Where can you learn more? Go to http://www.gray.com/ Enter D219 in the search box to learn more about \"Learning About High Cholesterol. \" Current as of: August 31, 2020               Content Version: 12.8 © 2006-2021 [employer]. Care instructions adapted under license by [employer] (which disclaims liability or warranty for this information). If you have questions about a medical condition or this instruction, always ask your healthcare professional. [address] any warranty or liability for your use of this information. High Blood Pressure: Care Instructions Overview It's normal for blood pressure to go up and down throughout the day. But if it stays up, you have high blood pressure. Another name for high blood pressure is hypertension. Despite what a lot of people think, high blood pressure usually doesn't cause headaches or make you feel dizzy or lightheaded. It usually has no symptoms. But it does increase your risk of stroke, heart attack, and other problems. You and your doctor will talk about your risks of these problems based on your blood pressure. Your doctor will give you a goal for your blood pressure. Your goal will be based on your health and your age. Lifestyle changes, such as eating healthy and being active, are always important to help lower blood pressure. You might also take medicine to reach your blood pressure goal. 
Follow-up care is a key part of your treatment and safety. Be sure to make and go to all appointments, and call your doctor if you are having problems. It's also a good idea to know your test results and keep a list of the medicines you take. How can you care for yourself at home? Medical treatment · If you stop taking your medicine, your blood pressure will go back up. You may take one or more types of medicine to lower your blood pressure. Be safe with medicines. Take your medicine exactly as prescribed. Call your doctor if you think you are having a problem with your medicine. · Talk to your doctor before you start taking aspirin every day.  Aspirin can help certain people lower their risk of a heart attack or stroke. But taking aspirin isn't right for everyone, because it can cause serious bleeding. · See your doctor regularly. You may need to see the doctor more often at first or until your blood pressure comes down. · If you are taking blood pressure medicine, talk to your doctor before you take decongestants or anti-inflammatory medicine, such as ibuprofen. Some of these medicines can raise blood pressure. · Learn how to check your blood pressure at home. Lifestyle changes · Stay at a healthy weight. This is especially important if you put on weight around the waist. Losing even 10 pounds can help you lower your blood pressure. · If your doctor recommends it, get more exercise. Walking is a good choice. Bit by bit, increase the amount you walk every day. Try for at least 30 minutes on most days of the week. You also may want to swim, bike, or do other activities. · Avoid or limit alcohol. Talk to your doctor about whether you can drink any alcohol. · Try to limit how much sodium you eat to less than 2,300 milligrams (mg) a day. Your doctor may ask you to try to eat less than 1,500 mg a day. · Eat plenty of fruits (such as bananas and oranges), vegetables, legumes, whole grains, and low-fat dairy products. · Lower the amount of saturated fat in your diet. Saturated fat is found in animal products such as milk, cheese, and meat. Limiting these foods may help you lose weight and also lower your risk for heart disease. · Do not smoke. Smoking increases your risk for heart attack and stroke. If you need help quitting, talk to your doctor about stop-smoking programs and medicines. These can increase your chances of quitting for good. When should you call for help? Call  911 anytime you think you may need emergency care. This may mean having symptoms that suggest that your blood pressure is causing a serious heart or blood vessel problem.  Your blood pressure may be over 180/120. For example, call 911 if: 
  · You have symptoms of a heart attack. These may include: 
? Chest pain or pressure, or a strange feeling in the chest. 
? Sweating. ? Shortness of breath. ? Nausea or vomiting. ? Pain, pressure, or a strange feeling in the back, neck, jaw, or upper belly or in one or both shoulders or arms. ? Lightheadedness or sudden weakness. ? A fast or irregular heartbeat.  
  · You have symptoms of a stroke. These may include: 
? Sudden numbness, tingling, weakness, or loss of movement in your face, arm, or leg, especially on only one side of your body. ? Sudden vision changes. ? Sudden trouble speaking. ? Sudden confusion or trouble understanding simple statements. ? Sudden problems with walking or balance. ? A sudden, severe headache that is different from past headaches.  
  · You have severe back or belly pain. Do not wait until your blood pressure comes down on its own. Get help right away. Call your doctor now or seek immediate care if: 
  · Your blood pressure is much higher than normal (such as 180/120 or higher), but you don't have symptoms.  
  · You think high blood pressure is causing symptoms, such as: 
? Severe headache. 
? Blurry vision. Watch closely for changes in your health, and be sure to contact your doctor if: 
  · Your blood pressure measures higher than your doctor recommends at least 2 times. That means the top number is higher or the bottom number is higher, or both.  
  · You think you may be having side effects from your blood pressure medicine. Where can you learn more? Go to http://www.gray.com/ Enter F560 in the search box to learn more about \"High Blood Pressure: Care Instructions. \" Current as of: August 31, 2020               Content Version: 12.8 © 2006-2021 [employer].   
Care instructions adapted under license by [employer] (which disclaims liability or warranty for this information). If you have questions about a medical condition or this instruction, always ask your healthcare professional. [place] [address] any warranty or liability for your use of this information.

## 2021-05-21 NOTE — PROGRESS NOTES
Subjective  Jazmin Malone is a 40 y.o. female presents for short interval hypertension follow up   HPI     Blood pressure readings better than before medication adjustment   Denies ADRs  She is exercising more and watching what she eats   She believes liver enzymes were elevated LOV because she was taking  weight loss medication, garcinia cambogia; she  Discontinued medication 1 week ago       Past Medical History:   Diagnosis Date    Bipolar 1 disorder (HCC)     Depression     Eczema     Elevated lipids     History of kidney stones        Current Outpatient Medications   Medication Sig    fluPHENAZine (PROLIXIN) 10 mg tablet take 1 tablet by mouth twice a day for 30 DAYS    carBAMazepine XR (TEGretol XR) 200 mg SR tablet Take 1 Tab by mouth nightly.  amLODIPine (NORVASC) 10 mg tablet Take 1 Tab by mouth daily.  Biotin 2,500 mcg cap Take  by mouth daily.  triamcinolone acetonide (KENALOG) 0.025 % topical cream Apply  to affected area two (2) times a day. use thin layer    ibuprofen (MOTRIN) 800 mg tablet Take 1 tablet by mouth every eight (8) hours as needed for Pain. No current facility-administered medications for this visit. Allergies   Allergen Reactions    Avocado Hives    Maycol Seed Hives    Geodon [Ziprasidone Hcl] Other (comments)     Pt states it makes her want to commit sucide.  Haldol [Haloperidol Lactate] Other (comments)     Extreme body aches    Penicillins Other (comments)     Allergy testing    Sulfur Other (comments)     Allergy testing     Review of Systems   Constitutional: Negative. Cardiovascular: Negative for chest pain and leg swelling. Neurological: Negative for dizziness and headaches.        Objective  Visit Vitals  /86 (BP 1 Location: Left upper arm, BP Patient Position: Sitting, BP Cuff Size: Adult long)   Pulse 81   Temp 98.9 °F (37.2 °C) (Oral)   Resp 16   Ht 5' 5.98\" (1.676 m)   Wt 212 lb 9.6 oz (96.4 kg)   SpO2 99%   BMI 34.34 kg/m² Physical Exam  Constitutional:       Appearance: Normal appearance. Cardiovascular:      Rate and Rhythm: Normal rate and regular rhythm. Pulses: Normal pulses. Heart sounds: Normal heart sounds. Skin:     General: Skin is warm and dry. Neurological:      General: No focal deficit present. Mental Status: She is alert. Mental status is at baseline. Psychiatric:         Mood and Affect: Mood normal.         Thought Content: Thought content normal.          Assessment & Plan    ICD-10-CM ICD-9-CM    1. Essential hypertension  I10 401.9    2. Elevated liver enzymes  R74.8 790.5 HEPATIC FUNCTION PANEL   3. Pure hypercholesterolemia  E78.00 272.0      Follow-up and Dispositions    · Return in about 3 months (around 8/21/2021) for htn. Encouraged to optimized BP control with lifestyle changes   current treatment plan is effective, no change in therapy  lab results and schedule of future lab studies reviewed with patient  reviewed diet, exercise and weight control    Patient voices understanding and acceptance of this advice and will call back if any further questions or concerns.   Rosalba Betancur NP

## 2021-05-21 NOTE — PROGRESS NOTES
Rm 9    Recent Travel Screening and Travel History documentation     Travel Screening     Question   Response    In the last month, have you been in contact with someone who was confirmed or suspected to have Coronavirus / COVID-19? No / Unsure    Have you had a COVID-19 viral test in the last 14 days? No    Do you have any of the following new or worsening symptoms? None of these    Have you traveled internationally or domestically in the last month? No      Travel History   Travel since 04/21/21     No documented travel since 04/21/21          Chief Complaint   Patient presents with    Hypertension   Pt is fasting    Pt states she was taking Ginkgo Biloba, and she believes that's why her liver enzymes were elevated. 1. Have you been to the ER, urgent care clinic since your last visit? Hospitalized since your last visit? No    2. Have you seen or consulted any other health care providers outside of the [address] [place] [address] since your last visit? Include any pap smears or colon screening. No        Health Maintenance Due   Topic Date Due    COVID-19 Vaccine (1) Never done    DTaP/Tdap/Td series (1 - Tdap) Never done           3 most recent PHQ Screens 5/21/2021   PHQ Not Done -   Little interest or pleasure in doing things Not at all   Feeling down, depressed, irritable, or hopeless Not at all   Total Score PHQ 2 0           Learning Assessment 5/22/2019   PRIMARY LEARNER Patient   HIGHEST LEVEL OF EDUCATION - PRIMARY LEARNER  SOME COLLEGE   BARRIERS PRIMARY LEARNER NONE   CO-LEARNER CAREGIVER No   PRIMARY LANGUAGE ENGLISH   LEARNER PREFERENCE PRIMARY READING   ANSWERED BY pt   RELATIONSHIP SELF         An After Visit Summary was printed and given to the patient.

## 2021-05-22 LAB
ALBUMIN SERPL-MCNC: 3.9 G/DL (ref 3.5–5)
ALBUMIN/GLOB SERPL: 1.1 {RATIO} (ref 1.1–2.2)
ALP SERPL-CCNC: 99 U/L (ref 45–117)
ALT SERPL-CCNC: 18 U/L (ref 12–78)
AST SERPL-CCNC: 14 U/L (ref 15–37)
BILIRUB DIRECT SERPL-MCNC: 0.1 MG/DL (ref 0–0.2)
BILIRUB SERPL-MCNC: 0.4 MG/DL (ref 0.2–1)
GLOBULIN SER CALC-MCNC: 3.7 G/DL (ref 2–4)
PROT SERPL-MCNC: 7.6 G/DL (ref 6.4–8.2)

## 2021-08-04 ENCOUNTER — TELEPHONE (OUTPATIENT)
Dept: INTERNAL MEDICINE CLINIC | Age: 46
End: 2021-08-04

## 2021-08-04 NOTE — TELEPHONE ENCOUNTER
Please advise patient her mammogram on 5/14/21 was abnormal. Several attempts have been made to contact her to schedule  additional imaging.  Please ask her to call [phone] or [phone] to schedule this

## 2021-08-04 NOTE — TELEPHONE ENCOUNTER
VM left for pt in regards to Mammogram results and providers recommendations. [facility] message also sent.

## 2021-09-29 DIAGNOSIS — R74.8 ELEVATED LIVER ENZYMES: ICD-10-CM

## 2021-09-30 RX ORDER — AMLODIPINE BESYLATE 10 MG/1
TABLET ORAL
Qty: 30 TABLET | Refills: 3 | Status: SHIPPED | OUTPATIENT
Start: 2021-09-30 | End: 2022-02-22

## 2022-01-05 DIAGNOSIS — F99 PSYCHIATRIC DISORDER: ICD-10-CM

## 2022-01-06 RX ORDER — CARBAMAZEPINE 200 MG/1
TABLET, EXTENDED RELEASE ORAL
Qty: 90 TABLET | Refills: 1 | Status: SHIPPED | OUTPATIENT
Start: 2022-01-06 | End: 2022-07-18

## 2022-02-21 DIAGNOSIS — R74.8 ELEVATED LIVER ENZYMES: ICD-10-CM

## 2022-02-22 RX ORDER — AMLODIPINE BESYLATE 10 MG/1
TABLET ORAL
Qty: 30 TABLET | Refills: 3 | Status: SHIPPED | OUTPATIENT
Start: 2022-02-22 | End: 2022-07-16

## 2022-06-06 ENCOUNTER — HOSPITAL ENCOUNTER (OUTPATIENT)
Dept: MAMMOGRAPHY | Age: 47
Discharge: HOME | End: 2022-06-06
Attending: NURSE PRACTITIONER
Payer: MEDICARE

## 2022-06-06 DIAGNOSIS — R92.8 ABNORMAL MAMMOGRAM OF LEFT BREAST: ICD-10-CM

## 2022-06-06 PROCEDURE — 77062 BREAST TOMOSYNTHESIS BI: CPT

## 2022-06-06 PROCEDURE — 76642 ULTRASOUND BREAST LIMITED: CPT

## 2022-07-15 DIAGNOSIS — R74.8 ELEVATED LIVER ENZYMES: ICD-10-CM

## 2022-07-16 RX ORDER — AMLODIPINE BESYLATE 10 MG/1
TABLET ORAL
Qty: 30 TABLET | Refills: 3 | Status: SHIPPED | OUTPATIENT
Start: 2022-07-16

## 2022-07-17 DIAGNOSIS — F99 PSYCHIATRIC DISORDER: ICD-10-CM

## 2022-07-18 RX ORDER — CARBAMAZEPINE 200 MG/1
TABLET, EXTENDED RELEASE ORAL
Qty: 90 TABLET | Refills: 1 | Status: SHIPPED | OUTPATIENT
Start: 2022-07-18

## 2022-08-18 DIAGNOSIS — F99 PSYCHIATRIC DISORDER: ICD-10-CM

## 2022-08-18 RX ORDER — FLUPHENAZINE HYDROCHLORIDE 10 MG/1
TABLET ORAL
Qty: 180 TABLET | Refills: 1 | Status: SHIPPED | OUTPATIENT
Start: 2022-08-18

## 2023-01-13 DIAGNOSIS — R74.8 ELEVATED LIVER ENZYMES: ICD-10-CM

## 2023-01-17 RX ORDER — AMLODIPINE BESYLATE 10 MG/1
TABLET ORAL
Qty: 30 TABLET | Refills: 3 | Status: SHIPPED | OUTPATIENT
Start: 2023-01-17

## 2023-01-25 DIAGNOSIS — F99 PSYCHIATRIC DISORDER: ICD-10-CM

## 2023-01-25 RX ORDER — CARBAMAZEPINE 200 MG/1
TABLET, EXTENDED RELEASE ORAL
Qty: 90 TABLET | Refills: 1 | Status: SHIPPED | OUTPATIENT
Start: 2023-01-25

## 2023-01-30 ENCOUNTER — PATIENT MESSAGE (OUTPATIENT)
Dept: OTHER | Facility: CLINIC | Age: 48
End: 2023-01-30

## 2023-05-26 RX ORDER — TRIAMCINOLONE ACETONIDE 0.25 MG/G
CREAM TOPICAL 2 TIMES DAILY
COMMUNITY
Start: 2020-02-03

## 2023-05-26 RX ORDER — AMLODIPINE BESYLATE 10 MG/1
1 TABLET ORAL DAILY
COMMUNITY
Start: 2023-01-17 | End: 2023-07-01 | Stop reason: SDUPTHER

## 2023-05-26 RX ORDER — FLUPHENAZINE HYDROCHLORIDE 10 MG/1
1 TABLET ORAL 2 TIMES DAILY
COMMUNITY
Start: 2022-08-18

## 2023-05-26 RX ORDER — IBUPROFEN 800 MG/1
800 TABLET ORAL EVERY 8 HOURS PRN
COMMUNITY
Start: 2015-01-02

## 2023-05-26 RX ORDER — CARBAMAZEPINE 200 MG/1
1 TABLET, EXTENDED RELEASE ORAL NIGHTLY
COMMUNITY
Start: 2023-01-25

## 2023-06-30 NOTE — TELEPHONE ENCOUNTER
Last appointment: 05/21/2021 BRANDYN Salcido   Next appointment: 08/03/2023 BRANDYN Salcido   Previous refill encounter(s):   01/17/2023 Norvasc #30 with 3 refills.      For Pharmacy Admin Tracking Only    Program: Medication Refill  Intervention Detail: New Rx: 1, reason: Patient Preference  Time Spent (min): 5      Requested Prescriptions     Pending Prescriptions Disp Refills    amLODIPine (NORVASC) 10 MG tablet 90 tablet 0     Sig: Take 1 tablet by mouth daily

## 2023-07-01 RX ORDER — AMLODIPINE BESYLATE 10 MG/1
10 TABLET ORAL DAILY
Qty: 90 TABLET | Refills: 3 | Status: SHIPPED | OUTPATIENT
Start: 2023-07-01

## 2023-08-08 RX ORDER — FLUPHENAZINE HYDROCHLORIDE 10 MG/1
10 TABLET ORAL 2 TIMES DAILY
Qty: 60 TABLET | Refills: 0 | OUTPATIENT
Start: 2023-08-08

## 2023-08-08 NOTE — TELEPHONE ENCOUNTER
Last appointment: 5/21/21 Danielle Race  Next appointment: None- Patient cancelled 8/1/23 Physical Appt. Previous refill encounter(s): 8/18/22 180 + 1    Requested Prescriptions     Pending Prescriptions Disp Refills    fluPHENAZine HCl (PROLIXIN) 10 MG tablet 60 tablet 0     Sig: Take 1 tablet by mouth 2 times daily     For Pharmacy Admin Tracking Only    Program: Medication Refill  CPA in place:    Recommendation Provided To:    Intervention Detail: New Rx: 1, reason: Patient Preference  Intervention Accepted By:   Varsha Loza Closed?:    Time Spent (min): 5

## 2024-01-03 ENCOUNTER — HOSPITAL ENCOUNTER (EMERGENCY)
Facility: HOSPITAL | Age: 49
Discharge: HOME | End: 2024-01-03
Attending: EMERGENCY MEDICINE
Payer: COMMERCIAL

## 2024-01-03 VITALS
DIASTOLIC BLOOD PRESSURE: 89 MMHG | WEIGHT: 211.86 LBS | RESPIRATION RATE: 18 BRPM | BODY MASS INDEX: 34.05 KG/M2 | OXYGEN SATURATION: 99 % | SYSTOLIC BLOOD PRESSURE: 152 MMHG | HEART RATE: 78 BPM | HEIGHT: 66 IN | TEMPERATURE: 98 F

## 2024-01-03 DIAGNOSIS — R11.0 NAUSEA: ICD-10-CM

## 2024-01-03 DIAGNOSIS — R53.83 FATIGUE, UNSPECIFIED TYPE: Primary | ICD-10-CM

## 2024-01-03 LAB
ALBUMIN SERPL-MCNC: 3.7 G/DL (ref 3.5–5)
ALBUMIN/GLOB SERPL: 0.9 (ref 1.1–2.2)
ALP SERPL-CCNC: 95 U/L (ref 45–117)
ALT SERPL-CCNC: 26 U/L (ref 12–78)
ANION GAP SERPL CALC-SCNC: 3 MMOL/L (ref 5–15)
AST SERPL-CCNC: 19 U/L (ref 15–37)
BASOPHILS # BLD: 0.1 K/UL (ref 0–0.1)
BASOPHILS NFR BLD: 1 % (ref 0–1)
BILIRUB SERPL-MCNC: 0.2 MG/DL (ref 0.2–1)
BUN SERPL-MCNC: 12 MG/DL (ref 6–20)
BUN/CREAT SERPL: 11 (ref 12–20)
CALCIUM SERPL-MCNC: 9.2 MG/DL (ref 8.5–10.1)
CHLORIDE SERPL-SCNC: 107 MMOL/L (ref 97–108)
CO2 SERPL-SCNC: 26 MMOL/L (ref 21–32)
COMMENT:: NORMAL
CREAT SERPL-MCNC: 1.09 MG/DL (ref 0.55–1.02)
DIFFERENTIAL METHOD BLD: ABNORMAL
EOSINOPHIL # BLD: 0.1 K/UL (ref 0–0.4)
EOSINOPHIL NFR BLD: 1 % (ref 0–7)
ERYTHROCYTE [DISTWIDTH] IN BLOOD BY AUTOMATED COUNT: 11.9 % (ref 11.5–14.5)
GLOBULIN SER CALC-MCNC: 4.1 G/DL (ref 2–4)
GLUCOSE SERPL-MCNC: 101 MG/DL (ref 65–100)
HCT VFR BLD AUTO: 41.7 % (ref 35–47)
HGB BLD-MCNC: 14.7 G/DL (ref 11.5–16)
IMM GRANULOCYTES # BLD AUTO: 0 K/UL (ref 0–0.04)
IMM GRANULOCYTES NFR BLD AUTO: 0 % (ref 0–0.5)
LYMPHOCYTES # BLD: 2.7 K/UL (ref 0.8–3.5)
LYMPHOCYTES NFR BLD: 35 % (ref 12–49)
MCH RBC QN AUTO: 31.1 PG (ref 26–34)
MCHC RBC AUTO-ENTMCNC: 35.3 G/DL (ref 30–36.5)
MCV RBC AUTO: 88.3 FL (ref 80–99)
MONOCYTES # BLD: 0.3 K/UL (ref 0–1)
MONOCYTES NFR BLD: 4 % (ref 5–13)
NEUTS SEG # BLD: 4.6 K/UL (ref 1.8–8)
NEUTS SEG NFR BLD: 59 % (ref 32–75)
NRBC # BLD: 0 K/UL (ref 0–0.01)
NRBC BLD-RTO: 0 PER 100 WBC
NT PRO BNP: 12 PG/ML
PLATELET # BLD AUTO: 323 K/UL (ref 150–400)
PMV BLD AUTO: 8.4 FL (ref 8.9–12.9)
POTASSIUM SERPL-SCNC: 4.2 MMOL/L (ref 3.5–5.1)
PROT SERPL-MCNC: 7.8 G/DL (ref 6.4–8.2)
RBC # BLD AUTO: 4.72 M/UL (ref 3.8–5.2)
SODIUM SERPL-SCNC: 136 MMOL/L (ref 136–145)
SPECIMEN HOLD: NORMAL
TSH SERPL DL<=0.05 MIU/L-ACNC: 1.46 UIU/ML (ref 0.36–3.74)
WBC # BLD AUTO: 7.9 K/UL (ref 3.6–11)

## 2024-01-03 PROCEDURE — 80053 COMPREHEN METABOLIC PANEL: CPT

## 2024-01-03 PROCEDURE — 84443 ASSAY THYROID STIM HORMONE: CPT

## 2024-01-03 PROCEDURE — 93005 ELECTROCARDIOGRAM TRACING: CPT | Performed by: STUDENT IN AN ORGANIZED HEALTH CARE EDUCATION/TRAINING PROGRAM

## 2024-01-03 PROCEDURE — 36415 COLL VENOUS BLD VENIPUNCTURE: CPT

## 2024-01-03 PROCEDURE — 2580000003 HC RX 258: Performed by: STUDENT IN AN ORGANIZED HEALTH CARE EDUCATION/TRAINING PROGRAM

## 2024-01-03 PROCEDURE — 96360 HYDRATION IV INFUSION INIT: CPT

## 2024-01-03 PROCEDURE — 83880 ASSAY OF NATRIURETIC PEPTIDE: CPT

## 2024-01-03 PROCEDURE — 85025 COMPLETE CBC W/AUTO DIFF WBC: CPT

## 2024-01-03 PROCEDURE — 99284 EMERGENCY DEPT VISIT MOD MDM: CPT

## 2024-01-03 RX ORDER — ONDANSETRON 2 MG/ML
4 INJECTION INTRAMUSCULAR; INTRAVENOUS ONCE
Status: DISCONTINUED | OUTPATIENT
Start: 2024-01-03 | End: 2024-01-03 | Stop reason: HOSPADM

## 2024-01-03 RX ORDER — 0.9 % SODIUM CHLORIDE 0.9 %
1000 INTRAVENOUS SOLUTION INTRAVENOUS ONCE
Status: COMPLETED | OUTPATIENT
Start: 2024-01-03 | End: 2024-01-03

## 2024-01-03 RX ORDER — ONDANSETRON 4 MG/1
4 TABLET, ORALLY DISINTEGRATING ORAL EVERY 8 HOURS PRN
Qty: 21 TABLET | Refills: 0 | Status: SHIPPED | OUTPATIENT
Start: 2024-01-03

## 2024-01-03 RX ADMIN — SODIUM CHLORIDE 1000 ML: 9 INJECTION, SOLUTION INTRAVENOUS at 19:33

## 2024-01-03 ASSESSMENT — PAIN - FUNCTIONAL ASSESSMENT: PAIN_FUNCTIONAL_ASSESSMENT: NONE - DENIES PAIN

## 2024-01-03 ASSESSMENT — ENCOUNTER SYMPTOMS
NAUSEA: 1
EYE DISCHARGE: 0
SHORTNESS OF BREATH: 0
VOMITING: 1
ABDOMINAL PAIN: 0

## 2024-01-03 NOTE — ED TRIAGE NOTES
Patient arrives to ED reports \"I'm not feeling well\" for 1 month.  Reports vomiting 2 days ago.

## 2024-01-04 LAB
EKG ATRIAL RATE: 80 BPM
EKG DIAGNOSIS: NORMAL
EKG P AXIS: 49 DEGREES
EKG P-R INTERVAL: 150 MS
EKG Q-T INTERVAL: 364 MS
EKG QRS DURATION: 92 MS
EKG QTC CALCULATION (BAZETT): 419 MS
EKG R AXIS: 21 DEGREES
EKG T AXIS: 25 DEGREES
EKG VENTRICULAR RATE: 80 BPM

## 2024-01-04 NOTE — ED PROVIDER NOTES
[facility] EMERGENCY DEP  EMERGENCY DEPARTMENT ENCOUNTER      Pt Name: Sanjiv Meyers  MRN: [id number]  Birthdate 1975  Date of evaluation: 1/3/2024  Provider: Miesha Ashley PA-C    CHIEF COMPLAINT       Chief Complaint   Patient presents with    Fatigue         HISTORY OF PRESENT ILLNESS    HPI  Patient is a 48 y.o. female who presents today with complaints of fatigue for the last month.  States symptoms are intermittent.  She came to the emergency department today because yesterday she developed some nausea and vomiting.  She denies any abdominal pain.  States she had to call out of work and needs a work note to return.  Denies fever, chest pain, status post, chills, flank pain, dysuria, hematuria.    Nursing Notes were reviewed.      REVIEW OF SYSTEMS       Review of Systems   Constitutional:  Positive for fatigue. Negative for fever.   HENT:  Negative for congestion.    Eyes:  Negative for discharge.   Respiratory:  Negative for shortness of breath.    Cardiovascular:  Negative for chest pain.   Gastrointestinal:  Positive for nausea and vomiting. Negative for abdominal pain.   Genitourinary:  Negative for dysuria.   Skin:  Negative for wound.   Neurological:  Negative for seizures.   Psychiatric/Behavioral:  Negative for suicidal ideas.        Except as noted above the remainder of the review of systems was reviewed and negative.       PAST MEDICAL HISTORY     Past Medical History:   Diagnosis Date    Bipolar 1 disorder (HCC)     Depression     Eczema     Elevated lipids     History of kidney stones          SURGICAL HISTORY       Past Surgical History:   Procedure Laterality Date    LITHOTRIPSY           CURRENT MEDICATIONS       Previous Medications    AMLODIPINE (NORVASC) 10 MG TABLET    Take 1 tablet by mouth daily    BIOTIN 2.5 MG CAPS    Take by mouth daily    CARBAMAZEPINE (TEGRETOL XR) 200 MG EXTENDED RELEASE TABLET    Take 1 tablet by mouth nightly    FLUPHENAZINE HCL (PROLIXIN) 10 MG TABLET

## 2024-01-04 NOTE — ED NOTES
Discharge instructions given to patient by provider. Pt ambulated out of ED in no apparent distress.

## 2024-05-01 VITALS
HEART RATE: 108 BPM | OXYGEN SATURATION: 96 % | SYSTOLIC BLOOD PRESSURE: 152 MMHG | TEMPERATURE: 97.5 F | RESPIRATION RATE: 18 BRPM | DIASTOLIC BLOOD PRESSURE: 87 MMHG

## 2024-05-01 LAB
APAP SERPL-MCNC: <2 UG/ML (ref 10–30)
BASOPHILS # BLD: 0.1 K/UL (ref 0–0.1)
BASOPHILS NFR BLD: 1 % (ref 0–1)
COMMENT:: NORMAL
DIFFERENTIAL METHOD BLD: ABNORMAL
EOSINOPHIL # BLD: 0.2 K/UL (ref 0–0.4)
EOSINOPHIL NFR BLD: 2 % (ref 0–7)
ERYTHROCYTE [DISTWIDTH] IN BLOOD BY AUTOMATED COUNT: 11.9 % (ref 11.5–14.5)
ETHANOL SERPL-MCNC: <10 MG/DL (ref 0–0.08)
HCT VFR BLD AUTO: 40.6 % (ref 35–47)
HGB BLD-MCNC: 14.9 G/DL (ref 11.5–16)
IMM GRANULOCYTES # BLD AUTO: 0.1 K/UL (ref 0–0.04)
IMM GRANULOCYTES NFR BLD AUTO: 1 % (ref 0–0.5)
LYMPHOCYTES # BLD: 3.6 K/UL (ref 0.8–3.5)
LYMPHOCYTES NFR BLD: 37 % (ref 12–49)
MCH RBC QN AUTO: 31.5 PG (ref 26–34)
MCHC RBC AUTO-ENTMCNC: 36.7 G/DL (ref 30–36.5)
MCV RBC AUTO: 85.8 FL (ref 80–99)
MONOCYTES # BLD: 0.5 K/UL (ref 0–1)
MONOCYTES NFR BLD: 5 % (ref 5–13)
NEUTS SEG # BLD: 5.3 K/UL (ref 1.8–8)
NEUTS SEG NFR BLD: 54 % (ref 32–75)
NRBC # BLD: 0 K/UL (ref 0–0.01)
NRBC BLD-RTO: 0 PER 100 WBC
PLATELET # BLD AUTO: 377 K/UL (ref 150–400)
PMV BLD AUTO: 8.1 FL (ref 8.9–12.9)
RBC # BLD AUTO: 4.73 M/UL (ref 3.8–5.2)
SALICYLATES SERPL-MCNC: <1.7 MG/DL (ref 2.8–20)
SPECIMEN HOLD: NORMAL
SPECIMEN HOLD: NORMAL
WBC # BLD AUTO: 9.8 K/UL (ref 3.6–11)

## 2024-05-01 PROCEDURE — 36415 COLL VENOUS BLD VENIPUNCTURE: CPT

## 2024-05-01 PROCEDURE — 85025 COMPLETE CBC W/AUTO DIFF WBC: CPT

## 2024-05-01 PROCEDURE — 80307 DRUG TEST PRSMV CHEM ANLYZR: CPT

## 2024-05-01 PROCEDURE — 80143 DRUG ASSAY ACETAMINOPHEN: CPT

## 2024-05-01 PROCEDURE — 81001 URINALYSIS AUTO W/SCOPE: CPT

## 2024-05-01 PROCEDURE — 80179 DRUG ASSAY SALICYLATE: CPT

## 2024-05-01 PROCEDURE — 82077 ASSAY SPEC XCP UR&BREATH IA: CPT

## 2024-05-01 PROCEDURE — 4500000002 HC ER NO CHARGE

## 2024-05-01 ASSESSMENT — PAIN SCALES - GENERAL: PAINLEVEL_OUTOF10: 10

## 2024-05-01 ASSESSMENT — PAIN DESCRIPTION - ORIENTATION: ORIENTATION: LEFT

## 2024-05-01 ASSESSMENT — PAIN - FUNCTIONAL ASSESSMENT: PAIN_FUNCTIONAL_ASSESSMENT: 0-10

## 2024-05-01 ASSESSMENT — PAIN DESCRIPTION - LOCATION: LOCATION: ARM

## 2024-05-02 ENCOUNTER — HOSPITAL ENCOUNTER (EMERGENCY)
Facility: HOSPITAL | Age: 49
Discharge: HOME | End: 2024-05-02
Payer: MEDICARE

## 2024-05-02 LAB
AMPHET UR QL SCN: NEGATIVE
APPEARANCE UR: ABNORMAL
BACTERIA URNS QL MICRO: ABNORMAL /HPF
BARBITURATES UR QL SCN: NEGATIVE
BENZODIAZ UR QL: NEGATIVE
BILIRUB UR QL CFM: NEGATIVE
BILIRUB UR QL: NEGATIVE
CANNABINOIDS UR QL SCN: NEGATIVE
COCAINE UR QL SCN: NEGATIVE
COLOR UR: ABNORMAL
EPITH CASTS URNS QL MICRO: ABNORMAL /LPF
GLUCOSE UR STRIP.AUTO-MCNC: NEGATIVE MG/DL
HGB UR QL STRIP: ABNORMAL
KETONES UR QL STRIP.AUTO: NEGATIVE MG/DL
LEUKOCYTE ESTERASE UR QL STRIP.AUTO: ABNORMAL
Lab: NORMAL
METHADONE UR QL: NEGATIVE
NITRITE UR QL STRIP.AUTO: NEGATIVE
OPIATES UR QL: NEGATIVE
PCP UR QL: NEGATIVE
PH UR STRIP: 6 (ref 5–8)
PROT UR STRIP-MCNC: 100 MG/DL
RBC #/AREA URNS HPF: ABNORMAL /HPF (ref 0–5)
SP GR UR REFRACTOMETRY: <1.005 (ref 1–1.03)
UROBILINOGEN UR QL STRIP.AUTO: 0.2 EU/DL (ref 0.2–1)
WBC URNS QL MICRO: ABNORMAL /HPF (ref 0–4)

## 2024-05-02 NOTE — ED NOTES
Pt agitated, stating she wants to leave. \"Tired of waiting\". Pt educated on importance of waiting to be seen. Pt verbalizes understanding and continues to endorse wish to leave. BSMART and Charge RN made aware. PD at bedside to speak to pt.     02:45 pt eloped.

## 2024-05-02 NOTE — ED TRIAGE NOTES
Pt presents to ed co delusions, paranoia, and manic behavior. Pt states she feels like someone is out to get her. Denies SI, but states she wants to hurt the people that are after her. Endorses [facility]. States she is hearing a whisper, and seeing shadows of spiders.     Pt has been off her psych meds for over a month.    HX of BPD with psychotic features, depression

## 2024-05-02 NOTE — BSMART NOTE
Bsmart Progress Note:     Writer informed of Pt requesting to leave ED during time of writer completing previous consult per Charge AVERY Rich     2:29am   Writer encouraged ED charge RN to informed [place] non-emergency office [place] who was also present in ED. Per [employer] officer Peoples does not meet ECO criteria and agreed to stay and speak with writer.     2:30am   Pt left ED before consult was completed per AVERY Conklin

## 2024-05-02 NOTE — BSMART NOTE
[place] Progress Note:     Bsmart Made aware of Pt and will see Pt after multiple consults in que

## 2024-10-24 ENCOUNTER — OFFICE VISIT (OUTPATIENT)
Facility: CLINIC | Age: 49
End: 2024-10-24
Payer: MEDICARE

## 2024-10-24 VITALS
RESPIRATION RATE: 16 BRPM | SYSTOLIC BLOOD PRESSURE: 162 MMHG | BODY MASS INDEX: 34.72 KG/M2 | WEIGHT: 216 LBS | HEART RATE: 90 BPM | OXYGEN SATURATION: 99 % | HEIGHT: 66 IN | DIASTOLIC BLOOD PRESSURE: 98 MMHG

## 2024-10-24 DIAGNOSIS — E78.5 HYPERLIPIDEMIA, UNSPECIFIED HYPERLIPIDEMIA TYPE: ICD-10-CM

## 2024-10-24 DIAGNOSIS — I10 ESSENTIAL HYPERTENSION: ICD-10-CM

## 2024-10-24 DIAGNOSIS — F31.9 BIPOLAR 1 DISORDER (HCC): Primary | ICD-10-CM

## 2024-10-24 PROCEDURE — G8427 DOCREV CUR MEDS BY ELIG CLIN: HCPCS | Performed by: NURSE PRACTITIONER

## 2024-10-24 PROCEDURE — G8484 FLU IMMUNIZE NO ADMIN: HCPCS | Performed by: NURSE PRACTITIONER

## 2024-10-24 PROCEDURE — 1036F TOBACCO NON-USER: CPT | Performed by: NURSE PRACTITIONER

## 2024-10-24 PROCEDURE — 99204 OFFICE O/P NEW MOD 45 MIN: CPT | Performed by: NURSE PRACTITIONER

## 2024-10-24 PROCEDURE — G8417 CALC BMI ABV UP PARAM F/U: HCPCS | Performed by: NURSE PRACTITIONER

## 2024-10-24 PROCEDURE — 3077F SYST BP >= 140 MM HG: CPT | Performed by: NURSE PRACTITIONER

## 2024-10-24 PROCEDURE — 3080F DIAST BP >= 90 MM HG: CPT | Performed by: NURSE PRACTITIONER

## 2024-10-24 RX ORDER — OLANZAPINE 10 MG/1
10 TABLET, ORALLY DISINTEGRATING ORAL NIGHTLY
COMMUNITY
Start: 2024-09-01 | End: 2024-10-24 | Stop reason: SDUPTHER

## 2024-10-24 RX ORDER — CARBAMAZEPINE 200 MG/1
200 TABLET, EXTENDED RELEASE ORAL NIGHTLY
Qty: 60 TABLET | Refills: 1 | Status: SHIPPED | OUTPATIENT
Start: 2024-10-24 | End: 2025-01-22

## 2024-10-24 RX ORDER — OLANZAPINE 20 MG/1
20 TABLET, ORALLY DISINTEGRATING ORAL NIGHTLY
Qty: 90 TABLET | Refills: 0 | Status: SHIPPED | OUTPATIENT
Start: 2024-10-24 | End: 2025-01-22

## 2024-10-24 RX ORDER — OLANZAPINE 20 MG/1
20 TABLET, ORALLY DISINTEGRATING ORAL NIGHTLY
COMMUNITY
Start: 2024-09-01 | End: 2024-10-24 | Stop reason: SDUPTHER

## 2024-10-24 RX ORDER — OLANZAPINE 10 MG/1
10 TABLET, ORALLY DISINTEGRATING ORAL NIGHTLY
Qty: 90 TABLET | Refills: 1 | Status: SHIPPED | OUTPATIENT
Start: 2024-10-24

## 2024-10-24 RX ORDER — LOSARTAN POTASSIUM 25 MG/1
25 TABLET ORAL DAILY
Qty: 30 TABLET | Refills: 0 | Status: SHIPPED | OUTPATIENT
Start: 2024-10-24 | End: 2024-11-23

## 2024-10-24 SDOH — ECONOMIC STABILITY: FOOD INSECURITY: WITHIN THE PAST 12 MONTHS, THE FOOD YOU BOUGHT JUST DIDN'T LAST AND YOU DIDN'T HAVE MONEY TO GET MORE.: NEVER TRUE

## 2024-10-24 SDOH — ECONOMIC STABILITY: FOOD INSECURITY: WITHIN THE PAST 12 MONTHS, YOU WORRIED THAT YOUR FOOD WOULD RUN OUT BEFORE YOU GOT MONEY TO BUY MORE.: NEVER TRUE

## 2024-10-24 SDOH — ECONOMIC STABILITY: INCOME INSECURITY: HOW HARD IS IT FOR YOU TO PAY FOR THE VERY BASICS LIKE FOOD, HOUSING, MEDICAL CARE, AND HEATING?: NOT HARD AT ALL

## 2024-10-24 ASSESSMENT — ENCOUNTER SYMPTOMS
TROUBLE SWALLOWING: 0
NAUSEA: 0
ABDOMINAL PAIN: 0
EYE REDNESS: 0
CHEST TIGHTNESS: 0
EYE DISCHARGE: 0
EYE PAIN: 0
SHORTNESS OF BREATH: 0
VOMITING: 0
SORE THROAT: 0
COLOR CHANGE: 0
RHINORRHEA: 0
COUGH: 0
WHEEZING: 0

## 2024-10-24 ASSESSMENT — PATIENT HEALTH QUESTIONNAIRE - PHQ9
SUM OF ALL RESPONSES TO PHQ9 QUESTIONS 1 & 2: 0
SUM OF ALL RESPONSES TO PHQ QUESTIONS 1-9: 0
1. LITTLE INTEREST OR PLEASURE IN DOING THINGS: NOT AT ALL
SUM OF ALL RESPONSES TO PHQ QUESTIONS 1-9: 0
2. FEELING DOWN, DEPRESSED OR HOPELESS: NOT AT ALL

## 2024-10-24 NOTE — PROGRESS NOTES
Chief Complaint   Patient presents with    Establish Care    Follow-up Chronic Condition    Medication Refill         BP (!) 162/98 (Site: Right Upper Arm, Position: Sitting)   Pulse 90   Resp 16   Ht 1.676 m (5' 6\")   Wt 98 kg (216 lb)   SpO2 99%   BMI 34.86 kg/m²         \"Have you been to the ER, urgent care clinic since your last visit?  Hospitalized since your last visit?\"    YES - When: approximately 3  weeks ago.  [facility]    “Have you seen or consulted any other health care providers outside our system since your last visit?”    NO    Have you had a mammogram?”   NO    Date of last Mammogram: 6/6/2022      “Have you had a pap smear?”    NO    Date of last Cervical Cancer screen (HPV or PAP): 5/22/2019       “Have you had a colorectal cancer screening such as a colonoscopy/FIT/Cologuard?    NO    No colonoscopy on file  No cologuard on file  No FIT/FOBT on file   No flexible sigmoidoscopy on file            
  Psychiatric:         Mood and Affect: Mood normal.         Behavior: Behavior normal.          Assessment & Plan  Assessment & Plan  Bipolar 1 disorder (HCC)   Chronic, at goal (stable), continue current treatment plan.  Patient reports doing well on current medication regimen.  Doing her response to medication management.  Advised patient to continue psychological surveillance and management and counseling support services.    Orders:    carBAMazepine (TEGRETOL XR) 200 MG extended release tablet; Take 1 tablet by mouth nightly    OLANZapine zydis (ZYPREXA) 10 MG disintegrating tablet; Take 1 tablet by mouth nightly    OLANZapine zydis (ZYPREXA) 20 MG disintegrating tablet; Take 1 tablet by mouth nightly    Essential hypertension   Chronic, not at goal (unstable), continue current treatment plan, medication adherence emphasized, and lifestyle modifications recommended.     Orders:    losartan (COZAAR) 25 MG tablet; Take 1 tablet by mouth daily    Hyperlipidemia, unspecified hyperlipidemia type  Patient informed of hyperlipidemia, however was not placed on statin.  Unable to verify currently lipid values, discussed with patient plan to order lipid panel, instructions provided for fasting prior to lab draw.  Will reevaluate at follow-up office visit, may consider statin therapy panel is abnormal.         Aspects of this note have been generated using voice recognition software. Despite editing, there may be some syntax errors.      OMERO Goldberg - NP

## 2024-10-25 PROBLEM — I10 ESSENTIAL HYPERTENSION: Status: ACTIVE | Noted: 2024-10-25

## 2024-10-25 PROBLEM — F31.9 BIPOLAR 1 DISORDER (HCC): Status: ACTIVE | Noted: 2024-10-25

## 2024-10-25 PROBLEM — E78.5 HYPERLIPIDEMIA: Status: ACTIVE | Noted: 2024-10-25

## 2024-10-25 NOTE — ASSESSMENT & PLAN NOTE
Chronic, at goal (stable), continue current treatment plan.  Patient reports doing well on current medication regimen.  Doing her response to medication management.  Advised patient to continue psychological surveillance and management and counseling support services.    Orders:    carBAMazepine (TEGRETOL XR) 200 MG extended release tablet; Take 1 tablet by mouth nightly    OLANZapine zydis (ZYPREXA) 10 MG disintegrating tablet; Take 1 tablet by mouth nightly    OLANZapine zydis (ZYPREXA) 20 MG disintegrating tablet; Take 1 tablet by mouth nightly

## 2024-10-25 NOTE — ASSESSMENT & PLAN NOTE
Chronic, not at goal (unstable), continue current treatment plan, medication adherence emphasized, and lifestyle modifications recommended.     Orders:    losartan (COZAAR) 25 MG tablet; Take 1 tablet by mouth daily

## 2024-10-25 NOTE — ASSESSMENT & PLAN NOTE
Patient informed of hyperlipidemia, however was not placed on statin.  Unable to verify currently lipid values, discussed with patient plan to order lipid panel, instructions provided for fasting prior to lab draw.  Will reevaluate at follow-up office visit, may consider statin therapy panel is abnormal.

## 2024-11-14 ENCOUNTER — TELEPHONE (OUTPATIENT)
Facility: CLINIC | Age: 49
End: 2024-11-14

## 2024-11-14 NOTE — TELEPHONE ENCOUNTER
Patient called stating that she does not have the coverage to pay for this medication.  Wants to know if there is a generic medication you can send in to the pharmacy in place of the following medications:      OLANZapine zydis (ZYPREXA) 10 MG disintegrating tablet       OLANZapine zydis (ZYPREXA) 20 MG disintegrating tablet        If so, can you please send to the pharmacy patient only has 6 days left of her medication and she is very upset.

## 2024-11-16 DIAGNOSIS — I10 ESSENTIAL HYPERTENSION: ICD-10-CM

## 2024-11-19 RX ORDER — LOSARTAN POTASSIUM 25 MG/1
25 TABLET ORAL DAILY
Qty: 30 TABLET | Refills: 0 | Status: SHIPPED | OUTPATIENT
Start: 2024-11-19

## 2024-12-16 DIAGNOSIS — F31.9 BIPOLAR 1 DISORDER (HCC): ICD-10-CM

## 2024-12-20 RX ORDER — CARBAMAZEPINE 200 MG/1
200 TABLET, EXTENDED RELEASE ORAL NIGHTLY
Qty: 90 TABLET | Refills: 2 | Status: SHIPPED | OUTPATIENT
Start: 2024-12-20

## 2024-12-27 DIAGNOSIS — F31.9 BIPOLAR 1 DISORDER (HCC): ICD-10-CM

## 2024-12-27 RX ORDER — OLANZAPINE 10 MG/1
10 TABLET, ORALLY DISINTEGRATING ORAL NIGHTLY
Qty: 90 TABLET | Refills: 2 | Status: SHIPPED | OUTPATIENT
Start: 2024-12-27

## 2025-01-09 DIAGNOSIS — F31.9 BIPOLAR 1 DISORDER (HCC): ICD-10-CM

## 2025-01-13 RX ORDER — OLANZAPINE 10 MG/1
10 TABLET, ORALLY DISINTEGRATING ORAL NIGHTLY
Qty: 0 TABLET | Refills: 0 | OUTPATIENT
Start: 2025-01-13

## 2025-01-31 DIAGNOSIS — F31.9 BIPOLAR 1 DISORDER (HCC): ICD-10-CM

## 2025-01-31 RX ORDER — OLANZAPINE 10 MG/1
10 TABLET, ORALLY DISINTEGRATING ORAL NIGHTLY
Qty: 90 TABLET | Refills: 3 | Status: SHIPPED | OUTPATIENT
Start: 2025-01-31

## 2025-03-19 DIAGNOSIS — I10 ESSENTIAL HYPERTENSION: ICD-10-CM

## 2025-03-24 RX ORDER — LOSARTAN POTASSIUM 25 MG/1
25 TABLET ORAL DAILY
Qty: 90 TABLET | Refills: 1 | Status: SHIPPED | OUTPATIENT
Start: 2025-03-24

## 2025-08-05 DIAGNOSIS — F31.9 BIPOLAR 1 DISORDER (HCC): ICD-10-CM

## 2025-08-05 RX ORDER — OLANZAPINE 10 MG/1
10 TABLET, ORALLY DISINTEGRATING ORAL NIGHTLY
Qty: 90 TABLET | Refills: 4 | Status: SHIPPED | OUTPATIENT
Start: 2025-08-05

## 2025-08-07 DIAGNOSIS — F31.9 BIPOLAR 1 DISORDER (HCC): ICD-10-CM

## 2025-08-07 RX ORDER — CARBAMAZEPINE 200 MG/1
200 TABLET, EXTENDED RELEASE ORAL NIGHTLY
Qty: 90 TABLET | Refills: 0 | Status: SHIPPED | OUTPATIENT
Start: 2025-08-07